# Patient Record
Sex: MALE | Race: WHITE | Employment: FULL TIME | URBAN - METROPOLITAN AREA
[De-identification: names, ages, dates, MRNs, and addresses within clinical notes are randomized per-mention and may not be internally consistent; named-entity substitution may affect disease eponyms.]

---

## 2022-01-01 ENCOUNTER — HOSPITAL ENCOUNTER (EMERGENCY)
Age: 42
Discharge: LEFT BEFORE DISCHARGE | End: 2022-01-01
Attending: EMERGENCY MEDICINE

## 2022-01-01 VITALS
WEIGHT: 180 LBS | OXYGEN SATURATION: 98 % | HEART RATE: 103 BPM | BODY MASS INDEX: 24.38 KG/M2 | SYSTOLIC BLOOD PRESSURE: 137 MMHG | HEIGHT: 72 IN | DIASTOLIC BLOOD PRESSURE: 91 MMHG | TEMPERATURE: 97.9 F | RESPIRATION RATE: 18 BRPM

## 2022-01-01 RX ORDER — DIAZEPAM 5 MG/1
5 TABLET ORAL ONCE
Status: DISCONTINUED | OUTPATIENT
Start: 2022-01-01 | End: 2022-01-01

## 2022-01-01 NOTE — ED TRIAGE NOTES
Patient to ER requesting alcohol detox. He is a daily drinker and drinks 1L of vodka a day. Last drink was 10am today. He denies previous seizures when detoxing. He does report he gets tremors within 12 hours of not drinking. Denies SI/HI.

## 2022-01-12 ENCOUNTER — HOSPITAL ENCOUNTER (INPATIENT)
Age: 42
LOS: 4 days | Discharge: HOME | DRG: 364 | End: 2022-01-17
Attending: STUDENT IN AN ORGANIZED HEALTH CARE EDUCATION/TRAINING PROGRAM | Admitting: INTERNAL MEDICINE
Payer: COMMERCIAL

## 2022-01-12 ENCOUNTER — APPOINTMENT (OUTPATIENT)
Dept: CT IMAGING | Age: 42
DRG: 364 | End: 2022-01-12
Payer: COMMERCIAL

## 2022-01-12 DIAGNOSIS — L02.11 NECK ABSCESS: ICD-10-CM

## 2022-01-12 DIAGNOSIS — F10.931 ACUTE HYPERACTIVE ALCOHOL WITHDRAWAL DELIRIUM (HCC): Primary | ICD-10-CM

## 2022-01-12 PROBLEM — F10.20 ETOH DEPENDENCE (HCC): Status: ACTIVE | Noted: 2022-01-12

## 2022-01-12 PROBLEM — L02.91 ABSCESS: Status: ACTIVE | Noted: 2022-01-12

## 2022-01-12 LAB
ALBUMIN SERPL-MCNC: 4.2 G/DL (ref 3.5–4.6)
ALP BLD-CCNC: 157 U/L (ref 35–104)
ALT SERPL-CCNC: 29 U/L (ref 0–41)
AMPHETAMINE SCREEN, URINE: ABNORMAL
ANION GAP SERPL CALCULATED.3IONS-SCNC: 19 MEQ/L (ref 9–15)
AST SERPL-CCNC: 122 U/L (ref 0–40)
BACTERIA: NEGATIVE /HPF
BARBITURATE SCREEN URINE: ABNORMAL
BASOPHILS ABSOLUTE: 0 K/UL (ref 0–0.2)
BASOPHILS RELATIVE PERCENT: 0.2 %
BENZODIAZEPINE SCREEN, URINE: ABNORMAL
BILIRUB SERPL-MCNC: 3.2 MG/DL (ref 0.2–0.7)
BILIRUBIN URINE: ABNORMAL
BLOOD, URINE: NEGATIVE
BUN BLDV-MCNC: 5 MG/DL (ref 6–20)
CALCIUM SERPL-MCNC: 8.5 MG/DL (ref 8.5–9.9)
CANNABINOID SCREEN URINE: POSITIVE
CHLORIDE BLD-SCNC: 86 MEQ/L (ref 95–107)
CLARITY: CLEAR
CO2: 26 MEQ/L (ref 20–31)
COCAINE METABOLITE SCREEN URINE: ABNORMAL
COLOR: ABNORMAL
CREAT SERPL-MCNC: 0.41 MG/DL (ref 0.7–1.2)
EOSINOPHILS ABSOLUTE: 0 K/UL (ref 0–0.7)
EOSINOPHILS RELATIVE PERCENT: 0 %
EPITHELIAL CELLS, UA: NORMAL /HPF (ref 0–5)
ETHANOL PERCENT: 0.12 G/DL
ETHANOL: 141 MG/DL (ref 0–0.08)
GFR AFRICAN AMERICAN: >60
GFR NON-AFRICAN AMERICAN: >60
GLOBULIN: 3.5 G/DL (ref 2.3–3.5)
GLUCOSE BLD-MCNC: 204 MG/DL (ref 70–99)
GLUCOSE URINE: 500 MG/DL
HCT VFR BLD CALC: 40.3 % (ref 42–52)
HEMOGLOBIN: 13.8 G/DL (ref 14–18)
HYALINE CASTS: NORMAL /HPF (ref 0–5)
KETONES, URINE: 15 MG/DL
LEUKOCYTE ESTERASE, URINE: ABNORMAL
LYMPHOCYTES ABSOLUTE: 0.2 K/UL (ref 1–4.8)
LYMPHOCYTES RELATIVE PERCENT: 4.4 %
Lab: ABNORMAL
MAGNESIUM: 1.6 MG/DL (ref 1.7–2.4)
MCH RBC QN AUTO: 34.1 PG (ref 27–31.3)
MCHC RBC AUTO-ENTMCNC: 34.2 % (ref 33–37)
MCV RBC AUTO: 99.5 FL (ref 80–100)
METHADONE SCREEN, URINE: ABNORMAL
MONOCYTES ABSOLUTE: 0.7 K/UL (ref 0.2–0.8)
MONOCYTES RELATIVE PERCENT: 12.7 %
NEUTROPHILS ABSOLUTE: 4.7 K/UL (ref 1.4–6.5)
NEUTROPHILS RELATIVE PERCENT: 82.7 %
NITRITE, URINE: POSITIVE
OPIATE SCREEN URINE: ABNORMAL
OXYCODONE URINE: ABNORMAL
PDW BLD-RTO: 12.9 % (ref 11.5–14.5)
PH UA: 5.5 (ref 5–9)
PHENCYCLIDINE SCREEN URINE: ABNORMAL
PLATELET # BLD: 64 K/UL (ref 130–400)
POTASSIUM SERPL-SCNC: 3.6 MEQ/L (ref 3.4–4.9)
PROCALCITONIN: 0.22 NG/ML (ref 0–0.15)
PROPOXYPHENE SCREEN: ABNORMAL
PROTEIN UA: 30 MG/DL
RBC # BLD: 4.05 M/UL (ref 4.7–6.1)
RBC UA: NORMAL /HPF (ref 0–5)
SARS-COV-2, NAAT: NOT DETECTED
SODIUM BLD-SCNC: 131 MEQ/L (ref 135–144)
SPECIFIC GRAVITY UA: 1.02 (ref 1–1.03)
TOTAL PROTEIN: 7.7 G/DL (ref 6.3–8)
URINE REFLEX TO CULTURE: ABNORMAL
UROBILINOGEN, URINE: 2 E.U./DL
WBC # BLD: 5.7 K/UL (ref 4.8–10.8)
WBC UA: NORMAL /HPF (ref 0–5)

## 2022-01-12 PROCEDURE — 87070 CULTURE OTHR SPECIMN AEROBIC: CPT

## 2022-01-12 PROCEDURE — 87635 SARS-COV-2 COVID-19 AMP PRB: CPT

## 2022-01-12 PROCEDURE — 84145 PROCALCITONIN (PCT): CPT

## 2022-01-12 PROCEDURE — 96365 THER/PROPH/DIAG IV INF INIT: CPT

## 2022-01-12 PROCEDURE — G0378 HOSPITAL OBSERVATION PER HR: HCPCS

## 2022-01-12 PROCEDURE — 70491 CT SOFT TISSUE NECK W/DYE: CPT

## 2022-01-12 PROCEDURE — 87185 SC STD ENZYME DETCJ PER NZM: CPT

## 2022-01-12 PROCEDURE — 96376 TX/PRO/DX INJ SAME DRUG ADON: CPT

## 2022-01-12 PROCEDURE — 96372 THER/PROPH/DIAG INJ SC/IM: CPT

## 2022-01-12 PROCEDURE — 85025 COMPLETE CBC W/AUTO DIFF WBC: CPT

## 2022-01-12 PROCEDURE — 99285 EMERGENCY DEPT VISIT HI MDM: CPT

## 2022-01-12 PROCEDURE — 6370000000 HC RX 637 (ALT 250 FOR IP): Performed by: STUDENT IN AN ORGANIZED HEALTH CARE EDUCATION/TRAINING PROGRAM

## 2022-01-12 PROCEDURE — 87040 BLOOD CULTURE FOR BACTERIA: CPT

## 2022-01-12 PROCEDURE — 36415 COLL VENOUS BLD VENIPUNCTURE: CPT

## 2022-01-12 PROCEDURE — 6360000002 HC RX W HCPCS: Performed by: STUDENT IN AN ORGANIZED HEALTH CARE EDUCATION/TRAINING PROGRAM

## 2022-01-12 PROCEDURE — 96366 THER/PROPH/DIAG IV INF ADDON: CPT

## 2022-01-12 PROCEDURE — 80053 COMPREHEN METABOLIC PANEL: CPT

## 2022-01-12 PROCEDURE — 81001 URINALYSIS AUTO W/SCOPE: CPT

## 2022-01-12 PROCEDURE — 6360000004 HC RX CONTRAST MEDICATION: Performed by: STUDENT IN AN ORGANIZED HEALTH CARE EDUCATION/TRAINING PROGRAM

## 2022-01-12 PROCEDURE — 87077 CULTURE AEROBIC IDENTIFY: CPT

## 2022-01-12 PROCEDURE — 6360000002 HC RX W HCPCS: Performed by: NURSE PRACTITIONER

## 2022-01-12 PROCEDURE — 96375 TX/PRO/DX INJ NEW DRUG ADDON: CPT

## 2022-01-12 PROCEDURE — 87186 SC STD MICRODIL/AGAR DIL: CPT

## 2022-01-12 PROCEDURE — 96367 TX/PROPH/DG ADDL SEQ IV INF: CPT

## 2022-01-12 PROCEDURE — 2580000003 HC RX 258: Performed by: STUDENT IN AN ORGANIZED HEALTH CARE EDUCATION/TRAINING PROGRAM

## 2022-01-12 PROCEDURE — 87147 CULTURE TYPE IMMUNOLOGIC: CPT

## 2022-01-12 PROCEDURE — 83735 ASSAY OF MAGNESIUM: CPT

## 2022-01-12 PROCEDURE — 82077 ASSAY SPEC XCP UR&BREATH IA: CPT

## 2022-01-12 PROCEDURE — 80307 DRUG TEST PRSMV CHEM ANLYZR: CPT

## 2022-01-12 PROCEDURE — 87205 SMEAR GRAM STAIN: CPT

## 2022-01-12 PROCEDURE — 87075 CULTR BACTERIA EXCEPT BLOOD: CPT

## 2022-01-12 PROCEDURE — 2500000003 HC RX 250 WO HCPCS: Performed by: STUDENT IN AN ORGANIZED HEALTH CARE EDUCATION/TRAINING PROGRAM

## 2022-01-12 RX ORDER — SODIUM CHLORIDE 0.9 % (FLUSH) 0.9 %
5-40 SYRINGE (ML) INJECTION PRN
Status: DISCONTINUED | OUTPATIENT
Start: 2022-01-12 | End: 2022-01-17 | Stop reason: HOSPADM

## 2022-01-12 RX ORDER — LORAZEPAM 2 MG/ML
2 INJECTION INTRAMUSCULAR ONCE
Status: COMPLETED | OUTPATIENT
Start: 2022-01-12 | End: 2022-01-12

## 2022-01-12 RX ORDER — 0.9 % SODIUM CHLORIDE 0.9 %
1000 INTRAVENOUS SOLUTION INTRAVENOUS ONCE
Status: COMPLETED | OUTPATIENT
Start: 2022-01-12 | End: 2022-01-13

## 2022-01-12 RX ORDER — MULTIVITAMIN WITH IRON
1 TABLET ORAL DAILY
Status: DISCONTINUED | OUTPATIENT
Start: 2022-01-13 | End: 2022-01-17 | Stop reason: HOSPADM

## 2022-01-12 RX ORDER — LORAZEPAM 1 MG/1
3 TABLET ORAL
Status: DISCONTINUED | OUTPATIENT
Start: 2022-01-12 | End: 2022-01-17 | Stop reason: HOSPADM

## 2022-01-12 RX ORDER — CLINDAMYCIN PHOSPHATE 900 MG/50ML
900 INJECTION INTRAVENOUS ONCE
Status: COMPLETED | OUTPATIENT
Start: 2022-01-12 | End: 2022-01-12

## 2022-01-12 RX ORDER — ACETAMINOPHEN 650 MG/1
650 SUPPOSITORY RECTAL EVERY 6 HOURS PRN
Status: DISCONTINUED | OUTPATIENT
Start: 2022-01-12 | End: 2022-01-17 | Stop reason: HOSPADM

## 2022-01-12 RX ORDER — LORAZEPAM 2 MG/ML
1 INJECTION INTRAMUSCULAR
Status: DISCONTINUED | OUTPATIENT
Start: 2022-01-12 | End: 2022-01-17 | Stop reason: HOSPADM

## 2022-01-12 RX ORDER — LORAZEPAM 1 MG/1
1 TABLET ORAL ONCE
Status: COMPLETED | OUTPATIENT
Start: 2022-01-12 | End: 2022-01-12

## 2022-01-12 RX ORDER — SODIUM CHLORIDE 9 MG/ML
25 INJECTION, SOLUTION INTRAVENOUS PRN
Status: DISCONTINUED | OUTPATIENT
Start: 2022-01-12 | End: 2022-01-17 | Stop reason: HOSPADM

## 2022-01-12 RX ORDER — LORAZEPAM 2 MG/ML
4 INJECTION INTRAMUSCULAR
Status: DISCONTINUED | OUTPATIENT
Start: 2022-01-12 | End: 2022-01-17 | Stop reason: HOSPADM

## 2022-01-12 RX ORDER — ONDANSETRON 4 MG/1
4 TABLET, ORALLY DISINTEGRATING ORAL EVERY 8 HOURS PRN
Status: DISCONTINUED | OUTPATIENT
Start: 2022-01-12 | End: 2022-01-17 | Stop reason: HOSPADM

## 2022-01-12 RX ORDER — SODIUM CHLORIDE 0.9 % (FLUSH) 0.9 %
5-40 SYRINGE (ML) INJECTION EVERY 12 HOURS SCHEDULED
Status: DISCONTINUED | OUTPATIENT
Start: 2022-01-12 | End: 2022-01-17 | Stop reason: HOSPADM

## 2022-01-12 RX ORDER — 0.9 % SODIUM CHLORIDE 0.9 %
1000 INTRAVENOUS SOLUTION INTRAVENOUS ONCE
Status: COMPLETED | OUTPATIENT
Start: 2022-01-12 | End: 2022-01-12

## 2022-01-12 RX ORDER — LORAZEPAM 1 MG/1
2 TABLET ORAL
Status: DISCONTINUED | OUTPATIENT
Start: 2022-01-12 | End: 2022-01-17 | Stop reason: HOSPADM

## 2022-01-12 RX ORDER — LORAZEPAM 1 MG/1
4 TABLET ORAL
Status: DISCONTINUED | OUTPATIENT
Start: 2022-01-12 | End: 2022-01-17 | Stop reason: HOSPADM

## 2022-01-12 RX ORDER — ONDANSETRON 2 MG/ML
4 INJECTION INTRAMUSCULAR; INTRAVENOUS EVERY 6 HOURS PRN
Status: DISCONTINUED | OUTPATIENT
Start: 2022-01-12 | End: 2022-01-17 | Stop reason: HOSPADM

## 2022-01-12 RX ORDER — POLYETHYLENE GLYCOL 3350 17 G/17G
17 POWDER, FOR SOLUTION ORAL DAILY PRN
Status: DISCONTINUED | OUTPATIENT
Start: 2022-01-12 | End: 2022-01-17 | Stop reason: HOSPADM

## 2022-01-12 RX ORDER — LORAZEPAM 1 MG/1
1 TABLET ORAL
Status: DISCONTINUED | OUTPATIENT
Start: 2022-01-12 | End: 2022-01-17 | Stop reason: HOSPADM

## 2022-01-12 RX ORDER — LANOLIN ALCOHOL/MO/W.PET/CERES
100 CREAM (GRAM) TOPICAL DAILY
Status: DISCONTINUED | OUTPATIENT
Start: 2022-01-13 | End: 2022-01-17 | Stop reason: HOSPADM

## 2022-01-12 RX ORDER — ACETAMINOPHEN 325 MG/1
650 TABLET ORAL EVERY 6 HOURS PRN
Status: DISCONTINUED | OUTPATIENT
Start: 2022-01-12 | End: 2022-01-17 | Stop reason: HOSPADM

## 2022-01-12 RX ORDER — LORAZEPAM 2 MG/ML
2 INJECTION INTRAMUSCULAR
Status: DISCONTINUED | OUTPATIENT
Start: 2022-01-12 | End: 2022-01-17 | Stop reason: HOSPADM

## 2022-01-12 RX ORDER — PROMETHAZINE HYDROCHLORIDE 25 MG/ML
25 INJECTION, SOLUTION INTRAMUSCULAR; INTRAVENOUS ONCE
Status: COMPLETED | OUTPATIENT
Start: 2022-01-12 | End: 2022-01-12

## 2022-01-12 RX ORDER — LORAZEPAM 2 MG/ML
3 INJECTION INTRAMUSCULAR
Status: DISCONTINUED | OUTPATIENT
Start: 2022-01-12 | End: 2022-01-17 | Stop reason: HOSPADM

## 2022-01-12 RX ADMIN — SODIUM CHLORIDE 1000 ML: 9 INJECTION, SOLUTION INTRAVENOUS at 17:36

## 2022-01-12 RX ADMIN — LORAZEPAM 2 MG: 2 INJECTION INTRAMUSCULAR; INTRAVENOUS at 15:29

## 2022-01-12 RX ADMIN — PROMETHAZINE HYDROCHLORIDE 25 MG: 25 INJECTION, SOLUTION INTRAMUSCULAR; INTRAVENOUS at 15:28

## 2022-01-12 RX ADMIN — CLINDAMYCIN PHOSPHATE 900 MG: 900 INJECTION, SOLUTION INTRAVENOUS at 22:13

## 2022-01-12 RX ADMIN — IOPAMIDOL 100 ML: 612 INJECTION, SOLUTION INTRAVENOUS at 20:36

## 2022-01-12 RX ADMIN — LORAZEPAM 2 MG: 2 INJECTION INTRAMUSCULAR; INTRAVENOUS at 21:39

## 2022-01-12 RX ADMIN — LORAZEPAM 3 MG: 2 INJECTION, SOLUTION INTRAMUSCULAR; INTRAVENOUS at 23:41

## 2022-01-12 RX ADMIN — SODIUM CHLORIDE 1000 ML: 9 INJECTION, SOLUTION INTRAVENOUS at 21:38

## 2022-01-12 RX ADMIN — LORAZEPAM 1 MG: 1 TABLET ORAL at 17:36

## 2022-01-12 RX ADMIN — THIAMINE HYDROCHLORIDE: 100 INJECTION, SOLUTION INTRAMUSCULAR; INTRAVENOUS at 15:37

## 2022-01-12 ASSESSMENT — ENCOUNTER SYMPTOMS
COUGH: 0
PHOTOPHOBIA: 0
BACK PAIN: 0
DIARRHEA: 0
CHEST TIGHTNESS: 0
ABDOMINAL PAIN: 0
NAUSEA: 1
SHORTNESS OF BREATH: 0
WHEEZING: 0
SINUS PRESSURE: 0
VOMITING: 1
TROUBLE SWALLOWING: 0
RHINORRHEA: 0
DIARRHEA: 1

## 2022-01-12 ASSESSMENT — PAIN DESCRIPTION - PAIN TYPE: TYPE: ACUTE PAIN

## 2022-01-12 ASSESSMENT — PAIN SCALES - GENERAL
PAINLEVEL_OUTOF10: 5
PAINLEVEL_OUTOF10: 0

## 2022-01-12 ASSESSMENT — PAIN DESCRIPTION - FREQUENCY: FREQUENCY: CONTINUOUS

## 2022-01-12 ASSESSMENT — PAIN DESCRIPTION - DESCRIPTORS: DESCRIPTORS: ACHING

## 2022-01-12 ASSESSMENT — PAIN DESCRIPTION - LOCATION: LOCATION: NECK

## 2022-01-12 NOTE — ED NOTES
Pt resting in bed, IV fluids infusing, reminded on need for urine specimen. Provided water per request by Claire Elmore.      Claire Jacobsen RN  01/12/22 4387

## 2022-01-12 NOTE — ED NOTES
Pt states he feels very dizzy (assisted pt to the bathroom). Pt was very unsteady on his feet.       Mayra Mary RN  01/12/22 8334

## 2022-01-13 PROBLEM — L02.11 NECK ABSCESS: Status: ACTIVE | Noted: 2022-01-12

## 2022-01-13 LAB
INR BLD: 1.2
PROTHROMBIN TIME: 14.8 SEC (ref 12.3–14.9)

## 2022-01-13 PROCEDURE — 6360000002 HC RX W HCPCS: Performed by: INTERNAL MEDICINE

## 2022-01-13 PROCEDURE — 85610 PROTHROMBIN TIME: CPT

## 2022-01-13 PROCEDURE — 99222 1ST HOSP IP/OBS MODERATE 55: CPT | Performed by: INTERNAL MEDICINE

## 2022-01-13 PROCEDURE — 6370000000 HC RX 637 (ALT 250 FOR IP): Performed by: INTERNAL MEDICINE

## 2022-01-13 PROCEDURE — 2580000003 HC RX 258: Performed by: INTERNAL MEDICINE

## 2022-01-13 PROCEDURE — 6370000000 HC RX 637 (ALT 250 FOR IP): Performed by: NURSE PRACTITIONER

## 2022-01-13 PROCEDURE — 2580000003 HC RX 258: Performed by: NURSE PRACTITIONER

## 2022-01-13 PROCEDURE — 96366 THER/PROPH/DIAG IV INF ADDON: CPT

## 2022-01-13 PROCEDURE — 36415 COLL VENOUS BLD VENIPUNCTURE: CPT

## 2022-01-13 PROCEDURE — 6360000002 HC RX W HCPCS: Performed by: NURSE PRACTITIONER

## 2022-01-13 PROCEDURE — 96376 TX/PRO/DX INJ SAME DRUG ADON: CPT

## 2022-01-13 PROCEDURE — 2060000000 HC ICU INTERMEDIATE R&B

## 2022-01-13 PROCEDURE — 2500000003 HC RX 250 WO HCPCS: Performed by: NURSE PRACTITIONER

## 2022-01-13 PROCEDURE — 82607 VITAMIN B-12: CPT

## 2022-01-13 PROCEDURE — 82746 ASSAY OF FOLIC ACID SERUM: CPT

## 2022-01-13 RX ORDER — L. ACIDOPHILUS/L.BULGARICUS 1MM CELL
1 TABLET ORAL 2 TIMES DAILY
Status: DISCONTINUED | OUTPATIENT
Start: 2022-01-13 | End: 2022-01-17 | Stop reason: HOSPADM

## 2022-01-13 RX ORDER — CLINDAMYCIN PHOSPHATE 900 MG/50ML
900 INJECTION INTRAVENOUS EVERY 8 HOURS
Status: DISCONTINUED | OUTPATIENT
Start: 2022-01-13 | End: 2022-01-13

## 2022-01-13 RX ORDER — MAGNESIUM SULFATE IN WATER 40 MG/ML
2000 INJECTION, SOLUTION INTRAVENOUS ONCE
Status: COMPLETED | OUTPATIENT
Start: 2022-01-13 | End: 2022-01-13

## 2022-01-13 RX ORDER — CHLORDIAZEPOXIDE HYDROCHLORIDE 25 MG/1
25 CAPSULE, GELATIN COATED ORAL 3 TIMES DAILY
Status: DISCONTINUED | OUTPATIENT
Start: 2022-01-13 | End: 2022-01-14

## 2022-01-13 RX ADMIN — Medication 10 ML: at 21:59

## 2022-01-13 RX ADMIN — MAGNESIUM SULFATE HEPTAHYDRATE 2000 MG: 2 INJECTION, SOLUTION INTRAVENOUS at 01:12

## 2022-01-13 RX ADMIN — LACTOBACILLUS TAB 1 TABLET: TAB at 21:59

## 2022-01-13 RX ADMIN — Medication 5 ML: at 09:12

## 2022-01-13 RX ADMIN — PIPERACILLIN SODIUM AND TAZOBACTAM SODIUM 3375 MG: 3; .375 INJECTION, POWDER, LYOPHILIZED, FOR SOLUTION INTRAVENOUS at 21:59

## 2022-01-13 RX ADMIN — THERA TABS 1 TABLET: TAB at 09:07

## 2022-01-13 RX ADMIN — LORAZEPAM 1 MG: 2 INJECTION INTRAMUSCULAR; INTRAVENOUS at 05:52

## 2022-01-13 RX ADMIN — LORAZEPAM 1 MG: 1 TABLET ORAL at 09:08

## 2022-01-13 RX ADMIN — LORAZEPAM 1 MG: 1 TABLET ORAL at 12:45

## 2022-01-13 RX ADMIN — CHLORDIAZEPOXIDE HYDROCHLORIDE 25 MG: 25 CAPSULE ORAL at 21:59

## 2022-01-13 RX ADMIN — Medication 100 MG: at 09:08

## 2022-01-13 RX ADMIN — CLINDAMYCIN PHOSPHATE 900 MG: 900 INJECTION, SOLUTION INTRAVENOUS at 11:03

## 2022-01-13 RX ADMIN — CHLORDIAZEPOXIDE HYDROCHLORIDE 25 MG: 25 CAPSULE ORAL at 11:02

## 2022-01-13 RX ADMIN — CHLORDIAZEPOXIDE HYDROCHLORIDE 25 MG: 25 CAPSULE ORAL at 14:34

## 2022-01-13 RX ADMIN — LORAZEPAM 1 MG: 1 TABLET ORAL at 18:20

## 2022-01-13 RX ADMIN — LACTOBACILLUS TAB 1 TABLET: TAB at 14:34

## 2022-01-13 RX ADMIN — PIPERACILLIN SODIUM AND TAZOBACTAM SODIUM 3375 MG: 3; .375 INJECTION, POWDER, LYOPHILIZED, FOR SOLUTION INTRAVENOUS at 14:45

## 2022-01-13 ASSESSMENT — PAIN SCALES - GENERAL: PAINLEVEL_OUTOF10: 0

## 2022-01-13 ASSESSMENT — ENCOUNTER SYMPTOMS
COUGH: 0
NAUSEA: 0
DIARRHEA: 0
VOMITING: 0
SHORTNESS OF BREATH: 0

## 2022-01-13 NOTE — CONSULTS
Department of Otolaryngology  Attending Consult Note      Reason for Consult: Evaluation and management for neck abscess  Requesting Physician: Dr. Heather Scanlon: Neck swelling of 3 to 4 days duration    History Obtained From:  patient    HISTORY OF PRESENT ILLNESS:                The patient is a 39 y.o. male with significant past medical history of alcohol withdrawal who presents with neck swelling of 3 to 4 days duration. Past Medical History:    No past medical history on file. Past Surgical History:    No past surgical history on file.   Current Medications:   Current Facility-Administered Medications: clindamycin (CLEOCIN) 900 mg in dextrose 5 % 50 mL IVPB, 900 mg, IntraVENous, Q8H  chlordiazePOXIDE (LIBRIUM) capsule 25 mg, 25 mg, Oral, TID  sodium chloride flush 0.9 % injection 5-40 mL, 5-40 mL, IntraVENous, 2 times per day  sodium chloride flush 0.9 % injection 5-40 mL, 5-40 mL, IntraVENous, PRN  0.9 % sodium chloride infusion, 25 mL, IntraVENous, PRN  ondansetron (ZOFRAN-ODT) disintegrating tablet 4 mg, 4 mg, Oral, Q8H PRN **OR** ondansetron (ZOFRAN) injection 4 mg, 4 mg, IntraVENous, Q6H PRN  polyethylene glycol (GLYCOLAX) packet 17 g, 17 g, Oral, Daily PRN  acetaminophen (TYLENOL) tablet 650 mg, 650 mg, Oral, Q6H PRN **OR** acetaminophen (TYLENOL) suppository 650 mg, 650 mg, Rectal, Q6H PRN  thiamine tablet 100 mg, 100 mg, Oral, Daily  multivitamin 1 tablet, 1 tablet, Oral, Daily  LORazepam (ATIVAN) tablet 1 mg, 1 mg, Oral, Q1H PRN **OR** LORazepam (ATIVAN) injection 1 mg, 1 mg, IntraVENous, Q1H PRN **OR** LORazepam (ATIVAN) tablet 2 mg, 2 mg, Oral, Q1H PRN **OR** LORazepam (ATIVAN) injection 2 mg, 2 mg, IntraVENous, Q1H PRN **OR** LORazepam (ATIVAN) tablet 3 mg, 3 mg, Oral, Q1H PRN **OR** LORazepam (ATIVAN) injection 3 mg, 3 mg, IntraVENous, Q1H PRN **OR** LORazepam (ATIVAN) tablet 4 mg, 4 mg, Oral, Q1H PRN **OR** LORazepam (ATIVAN) injection 4 mg, 4 mg, IntraVENous, Q1H PRN  Allergies:  Patient has no known allergies. Social History:    TOBACCO:   reports that he has been smoking cigarettes. He has been smoking about 0.25 packs per day. He has never used smokeless tobacco.  ETOH:   reports current alcohol use. DRUGS:   reports previous drug use. SEXUAL HISTORY:  @[facility]@  ACTIVITIES OF DAILY LIVING:  @[facility]@  MARITAL STATUS:  @[facility]@  OCCUPATION:  @[facility]@  LEVEL OF EDUCATION:  @[facility]@  Family History:   No family history on file. REVIEW OF SYSTEMS:    HEENT:  negative except for  hearing loss  RESPIRATORY:  negative except for    GASTROINTESTINAL:  negative except for     PHYSICAL EXAM:    VITALS:  /72   Pulse 98   Temp 97.9 °F (36.6 °C) (Oral)   Resp 18   Ht 6' (1.829 m)   Wt 185 lb (83.9 kg)   SpO2 97%   BMI 25.09 kg/m²   24HR INTAKE/OUTPUT:    Intake/Output Summary (Last 24 hours) at 1/13/2022 1045  Last data filed at 1/13/2022 0545  Gross per 24 hour   Intake 60 ml   Output --   Net 60 ml     URINARY CATHETER OUTPUT ([place]):     DRAIN/TUBE OUTPUT:     VENT SETTINGS:  Vent Information  SpO2: 97 %  Additional Respiratory  Assessments  Pulse: 98  Resp: 18  SpO2: 97 %  CONSTITUTIONAL:  awake, alert, cooperative, no apparent distress, and appears stated age  ENT:  Normocephalic, without obvious abnormality, atraumatic, sinuses nontender on palpation, external ears without lesions, oral pharynx with moist mucus membranes, tonsils without erythema or exudates, gums normal and good dentition.   NECK:  Supple, diffuse enlargement, symmetrical, trachea midline, no adenopathy, thyroid symmetric, not enlarged and no tenderness, skin normal    DATA:    Recent Labs     01/12/22  1430   WBC 5.7   HGB 13.8*   HCT 40.3*   PLT 64*     Recent Labs     01/12/22  1430   *   K 3.6   CL 86*   CO2 26   BUN 5*   CREATININE 0.41*   CALCIUM 8.5     Recent Labs     01/12/22  1430   *   ALT 29   BILITOT 3.2*   ALKPHOS 157*     No results for input(s): INR in the last 72 hours. No results for input(s): Xenia Cast in the last 72 hours. CT SOFT TISSUE NECK W CONTRAST    Result Date: 1/13/2022  EXAMINATION: CT SOFT TISSUE NECK W CONTRAST DATE AND TIME:1/12/2022 8:32 PM CLINICAL HISTORY: ACUTE NECK PAIN. SWELLING, DRAINAGE, PAIN COMPARISON: NONE TECHNIQUE: Sequential axial CT images were obtained from the skull base to the thoracic inlet. Following the administration of 100 cc of nonionic IV contrast.  All CT scans at this facility use dose modulation, iterative reconstruction, and/or weight based dosing when appropriate to reduce radiation dose to as low as reasonably achievable. FINDINGS: 4 x 3.8 x 1.5 cm enhancing fluid collection in the right submandibular space with adjacent thickening and enhancement of the right platysma muscle and adjacent to 3 cm area of edema/fluid in the overlying right subcutaneous tissues. Findings consistent with right submandibular space abscess. Diffuse subcutaneous edema throughout the neck area compatible with cellulitis. Dental caries in the area of the right lower second molar. Otherwise the soft tissues of the nasopharynx and oropharynx are within normal limits. Remaining deep spaces included the parotid are unremarkable. No salivary gland stone. No cervical adenopathy. Vascular structures are opacified and appear normal.. No airway compromise. Visualized sinuses are clear. RIGHT SUBMANDIBULAR ABSCESS WITH ASSOCIATED CELLULITIS OF THE NECK. IMPRESSION/RECOMMENDATIONS:      Neck abscess    Alcohol withdrawal    Mild trismus    Plan    I will review the x-ray with the radiologist it looks as though he would need incision and drainage of neck abscess and this can be done tomorrow and under general anesthesia.

## 2022-01-13 NOTE — CARE COORDINATION
[facility] Case Management Initial Discharge Assessment    Met with Patient to discuss discharge plan. PCP: No primary care provider on file. Date of Last Visit: none    If no PCP, list provided? Declined    Discharge Planning    Living Arrangements: independently at home    Who do you live with? Leatha Coombs   Who helps you with your care:  self    If lives at home:     Do you have any barriers navigating in your home? no    Patient can perform ADL? Yes    Current Services (outpatient and in home) :  None    Dialysis: No    Is transportation available to get to your appointments? Yes    DME Equipment:  NONE   Respiratory equipment: None    Respiratory provider:  no     Pharmacy:  yes - [facility] / [address] with Medication Assistance Program?  No      Patient agreeable to [facility]? No    Patient agreeable to SNF/Rehab? No    Other discharge needs identified? N/A    Does Patient Have a High-Risk for Readmission Diagnosis (CHF, PN, MI, COPD)? No         Initial Discharge Plan? (Note: please see concurrent daily documentation for any updates after initial note). Patient is alert, oriented pleasant. Patient plans to return home with juhi Zhang upon discharge. Patient has been through alcohol rehab in the past and states that he is determined to stop drinking. He is supported well by friends, family, fanny. Patient denies needs.      Readmission Risk              Risk of Unplanned Readmission:  0        DCCOP COMPLETED   Electronically signed by MAGALYS Sebastian, LSW on 1/13/2022 at 12:04 PM

## 2022-01-13 NOTE — ED PROVIDER NOTES
[facility] ED  eMERGENCYdEPARTMENT eNCOUnter      Pt Name: Itzel Johnston  MRN: [id number]  Abrahamgfyesi 1980  Date of evaluation: 1/12/2022  Provider:Jad Price DO    CHIEF COMPLAINT       Chief Complaint   Patient presents with    Alcohol Problem     pt c/o N/V/D, tremors, and chills from alcohol withdrawl, last drink approx 0600 today    Abscess     under chin on the right side (had 1.5 weeks or so)          HISTORY OF PRESENT ILLNESS  (Location/Symptom, Timing/Onset, Context/Setting, Quality, Duration, Modifying Factors, Severity.)   Itzel Johsnton is a 39 y.o. male who presents to the emergency department complaint of alcohol withdrawal.  Patient had \"the shakes\" some nausea and vomiting and chills. During the patient's work-up however he pointed to an area under his beard where there is a draining abscess. He states that he forgot to tell the ER physician about this. Immediately after reporting this to ED attending ordered a CAT scan of the neck. Patient is tachycardic. He is tremulous. He has dry heaves. Patient's last drink of alcohol was at 6 AM today. He usually drinks a bottle of vodka every single day. Patient states that 1 point he did go to rehab through a [facility] house and he was basically on lockdown for 3 whole months where he was sober. Patient states that he has been drinking alcohol for at least 20 years or more. Patient states he decided he wanted to quit today. CIWA score was 12 at time of the ED attending examined the patient. The history is provided by the patient. Nursing Notes were reviewed and I agree. REVIEW OF SYSTEMS    (2-9 systems for level 4, 10 or more for level 5)     Review of Systems   Constitutional: Negative for activity change, appetite change, chills, fever and unexpected weight change. HENT: Negative for drooling, ear pain, nosebleeds, sinus pressure and trouble swallowing.     Respiratory: Negative for cough, chest tightness and shortness of breath. Cardiovascular: Negative for chest pain and leg swelling. Gastrointestinal: Positive for diarrhea, nausea and vomiting. Negative for abdominal pain. Endocrine: Negative for polydipsia and polyphagia. Genitourinary: Negative for dysuria, flank pain and frequency. Musculoskeletal: Negative for back pain and myalgias. Skin: Positive for wound. Negative for pallor and rash. Neurological: Positive for tremors. Negative for syncope, weakness and headaches. Hematological: Does not bruise/bleed easily. All other systems reviewed and are negative. Except as noted above the remainder of the review of systems was reviewed and negative. PAST MEDICAL HISTORY   No past medical history on file. SURGICAL HISTORY     No past surgical history on file. CURRENT MEDICATIONS       Previous Medications    No medications on file       ALLERGIES     Patient has no known allergies. FAMILY HISTORY     No family history on file.        SOCIAL HISTORY       Social History     Socioeconomic History    Marital status:      Spouse name: Not on file    Number of children: Not on file    Years of education: Not on file    Highest education level: Not on file   Occupational History    Not on file   Tobacco Use    Smoking status: Current Every Day Smoker     Packs/day: 0.25     Types: Cigarettes    Smokeless tobacco: Never Used   Substance and Sexual Activity    Alcohol use: Yes     Comment: daily/ 1L vodka    Drug use: Not Currently    Sexual activity: Yes     Partners: Female   Other Topics Concern    Not on file   Social History Narrative    Not on file     Social Determinants of Health     Financial Resource Strain:     Difficulty of Paying Living Expenses: Not on file   Food Insecurity:     Worried About Running Out of Food in the Last Year: Not on file    [place] of Food in the Last Year: Not on file   Transportation Needs:     Lack of Transportation (Medical): Not on file    Lack of Transportation (Non-Medical): Not on file   Physical Activity:     Days of Exercise per Week: Not on file    Minutes of Exercise per Session: Not on file   Stress:     Feeling of Stress : Not on file   Social Connections:     Frequency of Communication with Friends and Family: Not on file    Frequency of Social Gatherings with Friends and Family: Not on file    Attends Confucianism Services: Not on file    Active Member of [address] [place] [address] or Organizations: Not on file    Attends Club or Organization Meetings: Not on file    Marital Status: Not on file   Intimate Partner Violence:     Fear of Current or Ex-Partner: Not on file    Emotionally Abused: Not on file    Physically Abused: Not on file    Sexually Abused: Not on file   Housing Stability:     Unable to Pay for Housing in the Last Year: Not on file    Number of [place] in the Last Year: Not on file    Unstable Housing in the Last Year: Not on file       SCREENINGS    Barbara Coma Scale  Eye Opening: Spontaneous  Best Verbal Response: Oriented  Best Motor Response: Obeys commands  [place] Coma Scale Score: 15      PHYSICAL EXAM    (up to 7 forlevel 4, 8 or more for level 5)     ED Triage Vitals [01/12/22 1407]   BP Temp Temp Source Pulse Resp SpO2 Height Weight   128/85 98.4 °F (36.9 °C) Oral 133 16 94 % 6' (1.829 m) 185 lb (83.9 kg)       Physical Exam  Vitals and nursing note reviewed. Constitutional:       General: He is in acute distress. Appearance: He is well-developed. He is not diaphoretic. HENT:      Head: Normocephalic and atraumatic. No Mcconnell's sign. Right Ear: External ear normal.      Left Ear: External ear normal.      Nose: Nose normal.      Mouth/Throat:      Mouth: Mucous membranes are moist.      Pharynx: No oropharyngeal exudate. Eyes:      General: No scleral icterus. Right eye: No foreign body.       Conjunctiva/sclera: Conjunctivae normal.      Left eye: No exudate. Pupils: Pupils are equal, round, and reactive to light. Neck:      Vascular: No JVD. Trachea: Trachea and phonation normal. No tracheal tenderness or tracheal deviation. Cardiovascular:      Rate and Rhythm: Regular rhythm. Tachycardia present. Pulses: Normal pulses. Heart sounds: Normal heart sounds. Heart sounds not distant. No murmur heard. No friction rub. No gallop. Pulmonary:      Effort: Pulmonary effort is normal. No respiratory distress. Breath sounds: Normal breath sounds. No stridor. No wheezing. Abdominal:      General: Bowel sounds are normal. There is no distension. Palpations: Abdomen is soft. Tenderness: There is no abdominal tenderness. There is no guarding or rebound. Musculoskeletal:         General: No tenderness. Normal range of motion. Cervical back: Normal range of motion and neck supple. No rigidity. No pain with movement. Lymphadenopathy:      Head:      Right side of head: No submental adenopathy. Left side of head: No submental adenopathy. Cervical: Cervical adenopathy present. Right cervical: Superficial cervical adenopathy present. Skin:     General: Skin is warm and dry. Capillary Refill: Capillary refill takes less than 2 seconds. Findings: No erythema or rash. Neurological:      General: No focal deficit present. Mental Status: He is alert and oriented to person, place, and time. Mental status is at baseline. GCS: GCS eye subscore is 4. GCS verbal subscore is 5. GCS motor subscore is 6. Cranial Nerves: No cranial nerve deficit. Motor: Tremor present. Coordination: Coordination normal.      Deep Tendon Reflexes: Reflexes are normal and symmetric. Psychiatric:         Behavior: Behavior normal.         Thought Content:  Thought content normal.         Judgment: Judgment normal.           DIAGNOSTIC RESULTS     RADIOLOGY:   Non-plain film images such as CT, Ultrasound and MRI are read by the radiologist. [facility] radiographic images are visualized and preliminarilyinterpreted by Abena Arellano DO with the below findings:    CT of the neck with IV contrast (stat rad reading of soft tissue neck and colon (peripheral enhancing fluid collection right platysma muscle measuring 4 x 3.8 x 1.5 cm compatible with abscess. Moderate superficial fluid and adjacent subcu fat measuring 2.7 cm. Moderate cellulitis right neck. Moderate motion artifact. No airway compromise.     Interpretation per the Radiologist below, if available at the time of this note:    CT SOFT TISSUE NECK W CONTRAST    (Results Pending)       LABS:  Labs Reviewed   COMPREHENSIVE METABOLIC PANEL - Abnormal; Notable for the following components:       Result Value    Sodium 131 (*)     Chloride 86 (*)     Anion Gap 19 (*)     Glucose 204 (*)     BUN 5 (*)     CREATININE 0.41 (*)     Total Bilirubin 3.2 (*)     Alkaline Phosphatase 157 (*)      (*)     All other components within normal limits   CBC WITH AUTO DIFFERENTIAL - Abnormal; Notable for the following components:    RBC 4.05 (*)     Hemoglobin 13.8 (*)     Hematocrit 40.3 (*)     MCH 34.1 (*)     Platelets 64 (*)     Lymphocytes Absolute 0.2 (*)     All other components within normal limits   URINE RT REFLEX TO CULTURE - Abnormal; Notable for the following components:    Color, UA ORANGE (*)     Glucose, Ur 500 (*)     Bilirubin Urine MODERATE (*)     Ketones, Urine 15 (*)     Protein, UA 30 (*)     Urobilinogen, Urine 2.0 (*)     Nitrite, Urine POSITIVE (*)     Leukocyte Esterase, Urine TRACE (*)     All other components within normal limits   URINE DRUG SCREEN - Abnormal; Notable for the following components:    Cannabinoid Scrn, Ur POSITIVE (*)     All other components within normal limits   PROCALCITONIN - Abnormal; Notable for the following components:    Procalcitonin 0.22 (*)     All other components within normal limits   MAGNESIUM - Abnormal; Notable for the following components:    Magnesium 1.6 (*)     All other components within normal limits   COVID-19, RAPID   CULTURE, BLOOD 2   CULTURE, BLOOD 1   CULTURE, ANAEROBIC AND AEROBIC   ETHANOL   MICROSCOPIC URINALYSIS   MAGNESIUM       All other labs were within normal range or not returnedas of this dictation. EMERGENCYDEPARTMENT COURSE and DIFFERENTIAL DIAGNOSIS/MDM:   Vitals:    Vitals:    01/12/22 1931 01/12/22 2116 01/12/22 2125 01/12/22 2219   BP: (!) 151/98 (!) 147/90 (!) 179/102 (!) 138/92   Pulse: 115 119 142 132   Resp: 18 18     Temp:       TempSrc:       SpO2: 99% 99%     Weight:       Height:               MDM  Patient is acute alcohol intoxication. He received IV banana bag. Doses of IV Ativan. IM Phenergan. Patient's heart rate had come down to 115 when reevaluated abscess to the patient's neck that is hidden within his beard. ER physician stable palpate the areas got some induration as well as easily expressible with copious amounts of purulent material.    ED attending asked the patient why he did not tell him about this. The patient states \"I forgot. \"    At this point a CAT scan of the neck was then ordered. Shows an abscess. Nurse was able to get a wound culture and copious amounts of purulent material easily came out. Patient started on IV clindamycin. Patient to be admitted for alcohol intoxication, neck cellulitis and abscess. Was discussed with the hospitalist and Dr. Edna Barry accepted the patient. PROCEDURES:    Procedures      FINAL IMPRESSION      1. Acute hyperactive alcohol withdrawal delirium ([facility] [address]    2. Neck abscess          DISPOSITION/PLAN   DISPOSITION Admitted 01/12/2022 10:06:06 PM      PATIENT REFERRED TO:  No follow-up provider specified.     DISCHARGE MEDICATIONS:  New Prescriptions    No medications on file       (Please note thatportions of this note were completed with a voice recognition program.  Efforts were made to edit the dictations but occasionally words are mis-transcribed.)    Jignesh Price,       [address] [facility],   01/12/22 3898

## 2022-01-13 NOTE — PROGRESS NOTES
Progress Note  Date:2022       [facility]  Patient Sawyer Lamar     Date of Birth:[birth year]     Age:41 y.o. Subjective    Subjective:  Symptoms:  He reports malaise and weakness. No shortness of breath, cough, chest pain, headache, chest pressure, anorexia, diarrhea or anxiety. Diet:  No nausea or vomiting. Review of Systems   Respiratory: Negative for cough and shortness of breath. Cardiovascular: Negative for chest pain. Gastrointestinal: Negative for anorexia, diarrhea, nausea and vomiting. Neurological: Positive for weakness. Objective         Vitals Last 24 Hours:  TEMPERATURE:  Temp  Av.2 °F (36.8 °C)  Min: 97.9 °F (36.6 °C)  Max: 98.4 °F (36.9 °C)  RESPIRATIONS RANGE: Resp  Av.2  Min: 16  Max: 18  PULSE OXIMETRY RANGE: SpO2  Av.4 %  Min: 94 %  Max: 99 %  PULSE RANGE: Pulse  Av.8  Min: 96  Max: 142  BLOOD PRESSURE RANGE: Systolic (40SOI), QE , Min:109 , OQB:711   ; Diastolic (26KTW), SY, Min:72, Max:102    I/O (24Hr): Intake/Output Summary (Last 24 hours) at 2022 1152  Last data filed at 2022 0545  Gross per 24 hour   Intake 60 ml   Output --   Net 60 ml     Objective:  General Appearance:  Comfortable, well-appearing and in no acute distress. Vital signs: (most recent): Blood pressure 109/72, pulse 98, temperature 97.9 °F (36.6 °C), temperature source Oral, resp. rate 18, height 6' (1.829 m), weight 185 lb (83.9 kg), SpO2 97 %. Lungs:  Normal effort and normal respiratory rate. Heart: Normal rate. S1 normal and S2 normal.    Abdomen: Abdomen is soft. Bowel sounds are normal.     Pulses: Distal pulses are intact. Neurological: Patient is alert and oriented to person, place and time. Pupils:  Pupils are equal, round, and reactive to light. Skin:  Warm and dry.       Labs/Imaging/Diagnostics    Labs:  CBC:  Recent Labs     22  1430   WBC 5.7   RBC 4.05*   HGB 13.8*   HCT 40.3*   MCV 99.5   RDW 12.9 PLT 64*     CHEMISTRIES:  Recent Labs     01/12/22  1430 01/12/22 1951   *  --    K 3.6  --    CL 86*  --    CO2 26  --    BUN 5*  --    CREATININE 0.41*  --    GLUCOSE 204*  --    MG  --  1.6*     PT/INR:No results for input(s): PROTIME, INR in the last 72 hours. APTT:No results for input(s): APTT in the last 72 hours. LIVER PROFILE:  Recent Labs     01/12/22  1430   *   ALT 29   BILITOT 3.2*   ALKPHOS 157*       Imaging Last 24 Hours:  CT SOFT TISSUE NECK W CONTRAST    Result Date: 1/13/2022  EXAMINATION: CT SOFT TISSUE NECK W CONTRAST DATE AND TIME:1/12/2022 8:32 PM CLINICAL HISTORY: ACUTE NECK PAIN. SWELLING, DRAINAGE, PAIN COMPARISON: NONE TECHNIQUE: Sequential axial CT images were obtained from the skull base to the thoracic inlet. Following the administration of 100 cc of nonionic IV contrast.  All CT scans at this facility use dose modulation, iterative reconstruction, and/or weight based dosing when appropriate to reduce radiation dose to as low as reasonably achievable. FINDINGS: 4 x 3.8 x 1.5 cm enhancing fluid collection in the right submandibular space with adjacent thickening and enhancement of the right platysma muscle and adjacent to 3 cm area of edema/fluid in the overlying right subcutaneous tissues. Findings consistent with right submandibular space abscess. Diffuse subcutaneous edema throughout the neck area compatible with cellulitis. Dental caries in the area of the right lower second molar. Otherwise the soft tissues of the nasopharynx and oropharynx are within normal limits. Remaining deep spaces included the parotid are unremarkable. No salivary gland stone. No cervical adenopathy. Vascular structures are opacified and appear normal.. No airway compromise. Visualized sinuses are clear. RIGHT SUBMANDIBULAR ABSCESS WITH ASSOCIATED CELLULITIS OF THE NECK.      Assessment//Plan           Hospital Problems           Last Modified POA    * (Principal) Abscess 1/12/2022 Yes EtOH dependence  1/12/2022 Yes      neck abscess  etoh abuse   Assessment & Plan  1/13: Continue IV clinda, add Librium. Continue CIWA protocol. ID evaluation. Follow-up cultures. Appreciated ENT input, change pt to inpt as per PA recommendation.   Electronically signed by Brianna Mejia MD on 1/13/22 at 11:52 AM EST

## 2022-01-13 NOTE — H&P
[facility]  MEDICINE    HISTORY AND PHYSICAL EXAM    PATIENT NAME:  Magali Baldwin    MRN:  [id number]  SERVICE DATE:  1/12/2022   SERVICE TIME:  11:00 PM    Primary Care Physician: No primary care provider on file. SUBJECTIVE  CHIEF COMPLAINT: Alcohol withdrawal    HPI: Patient being admitted for abscess of the neck. Patient originally came into the ED for concern of alcohol withdrawal and wanting to detox. Patient reports that he drinks 1 L vodka daily. While in the ED patient mentioned he had a sore under his neck that been going on for 1 week. Patient states that under his beard and purulent drainage was expressed by the ER staff and culture sent. Patient denies any fever, nausea, or vomiting. Patient denies any other medical history. PAST MEDICAL HISTORY:  No past medical history on file. PAST SURGICAL HISTORY:  No past surgical history on file. FAMILY HISTORY:  No family history on file. SOCIAL HISTORY:    Social History     Socioeconomic History    Marital status:      Spouse name: Not on file    Number of children: Not on file    Years of education: Not on file    Highest education level: Not on file   Occupational History    Not on file   Tobacco Use    Smoking status: Current Every Day Smoker     Packs/day: 0.25     Types: Cigarettes    Smokeless tobacco: Never Used   Substance and Sexual Activity    Alcohol use: Yes     Comment: daily/ 1L vodka    Drug use: Not Currently    Sexual activity: Yes     Partners: Female   Other Topics Concern    Not on file   Social History Narrative    Not on file     Social Determinants of Health     Financial Resource Strain:     Difficulty of Paying Living Expenses: Not on file   Food Insecurity:     Worried About Running Out of Food in the Last Year: Not on file    [place] of Food in the Last Year: Not on file   Transportation Needs:     Lack of Transportation (Medical):  Not on file    Lack of Transportation (Non-Medical): Not on file   Physical Activity:     Days of Exercise per Week: Not on file    Minutes of Exercise per Session: Not on file   Stress:     Feeling of Stress : Not on file   Social Connections:     Frequency of Communication with Friends and Family: Not on file    Frequency of Social Gatherings with Friends and Family: Not on file    Attends Yarsani Services: Not on file    Active Member of [address] [place] [address] or Organizations: Not on file    Attends Club or Organization Meetings: Not on file    Marital Status: Not on file   Intimate Partner Violence:     Fear of Current or Ex-Partner: Not on file    Emotionally Abused: Not on file    Physically Abused: Not on file    Sexually Abused: Not on file   Housing Stability:     Unable to Pay for Housing in the Last Year: Not on file    Number of [place] in the Last Year: Not on file    Unstable Housing in the Last Year: Not on file     MEDICATIONS:   Prior to Admission medications    Not on File       ALLERGIES: Patient has no known allergies. REVIEW OF SYSTEM:   Review of Systems   Constitutional: Negative for activity change. HENT: Negative for ear pain and rhinorrhea. Eyes: Negative for photophobia and visual disturbance. Respiratory: Negative for shortness of breath and wheezing. Gastrointestinal: Negative for diarrhea. Endocrine: Negative for polydipsia, polyphagia and polyuria. Genitourinary: Negative for dysuria, flank pain, hematuria and urgency. Musculoskeletal: Negative for back pain and neck stiffness. Skin: Positive for wound. Allergic/Immunologic: Negative for immunocompromised state. Neurological: Negative for dizziness. Psychiatric/Behavioral: Negative for behavioral problems. OBJECTIVE  PHYSICAL EXAM: BP (!) 138/92   Pulse 132   Temp 98.4 °F (36.9 °C) (Oral)   Resp 18   Ht 6' (1.829 m)   Wt 185 lb (83.9 kg)   SpO2 99%   BMI 25.09 kg/m²     Physical Exam  Vitals and nursing note reviewed. Constitutional:       Appearance: He is well-developed. HENT:      Head: Normocephalic and atraumatic. Nose: Nose normal.   Eyes:      Pupils: Pupils are equal, round, and reactive to light. Neck:     Cardiovascular:      Rate and Rhythm: Normal rate and regular rhythm. Heart sounds: Normal heart sounds. Pulmonary:      Effort: Pulmonary effort is normal. No respiratory distress. Breath sounds: Normal breath sounds. No wheezing. Abdominal:      General: Bowel sounds are normal.      Palpations: Abdomen is soft. There is no mass. Tenderness: There is no abdominal tenderness. Musculoskeletal:         General: Normal range of motion. Cervical back: Normal range of motion. Lymphadenopathy:      Cervical: No cervical adenopathy. Skin:     General: Skin is warm and dry. Capillary Refill: Capillary refill takes less than 2 seconds. Neurological:      Mental Status: He is alert and oriented to person, place, and time. Motor: Tremor present. Deep Tendon Reflexes: Reflexes normal.   Psychiatric:         Thought Content: Thought content normal.         DATA:     Diagnostic tests reviewed for today's visit:    Most recent labs and imaging results reviewed.      LABS:    Recent Results (from the past 24 hour(s))   Comprehensive Metabolic Panel    Collection Time: 01/12/22  2:30 PM   Result Value Ref Range    Sodium 131 (L) 135 - 144 mEq/L    Potassium 3.6 3.4 - 4.9 mEq/L    Chloride 86 (L) 95 - 107 mEq/L    CO2 26 20 - 31 mEq/L    Anion Gap 19 (H) 9 - 15 mEq/L    Glucose 204 (H) 70 - 99 mg/dL    BUN 5 (L) 6 - 20 mg/dL    CREATININE 0.41 (L) 0.70 - 1.20 mg/dL    GFR Non-African American >60.0 >60    GFR  >60.0 >60    Calcium 8.5 8.5 - 9.9 mg/dL    Total Protein 7.7 6.3 - 8.0 g/dL    Albumin 4.2 3.5 - 4.6 g/dL    Total Bilirubin 3.2 (H) 0.2 - 0.7 mg/dL    Alkaline Phosphatase 157 (H) 35 - 104 U/L    ALT 29 0 - 41 U/L     (H) 0 - 40 U/L    Globulin 3.5 2.3 - 3.5 g/dL   CBC Auto Differential    Collection Time: 01/12/22  2:30 PM   Result Value Ref Range    WBC 5.7 4.8 - 10.8 K/uL    RBC 4.05 (L) 4.70 - 6.10 M/uL    Hemoglobin 13.8 (L) 14.0 - 18.0 g/dL    Hematocrit 40.3 (L) 42.0 - 52.0 %    MCV 99.5 80.0 - 100.0 fL    MCH 34.1 (H) 27.0 - 31.3 pg    MCHC 34.2 33.0 - 37.0 %    RDW 12.9 11.5 - 14.5 %    Platelets 64 (L) 159 - 400 K/uL    Neutrophils % 82.7 %    Lymphocytes % 4.4 %    Monocytes % 12.7 %    Eosinophils % 0.0 %    Basophils % 0.2 %    Neutrophils Absolute 4.7 1.4 - 6.5 K/uL    Lymphocytes Absolute 0.2 (L) 1.0 - 4.8 K/uL    Monocytes Absolute 0.7 0.2 - 0.8 K/uL    Eosinophils Absolute 0.0 0.0 - 0.7 K/uL    Basophils Absolute 0.0 0.0 - 0.2 K/uL   Ethanol    Collection Time: 01/12/22  2:30 PM   Result Value Ref Range    Ethanol Lvl 141 mg/dL    Ethanol percent 0.124 G/dL   Urinalysis Reflex to Culture    Collection Time: 01/12/22  2:30 PM    Specimen: Urine, clean catch   Result Value Ref Range    Color, UA ORANGE (A) Straw/Yellow    Clarity, UA Clear Clear    Glucose, Ur 500 (A) Negative mg/dL    Bilirubin Urine MODERATE (A) Negative    Ketones, Urine 15 (A) Negative mg/dL    Specific Gravity, UA 1.024 1.005 - 1.030    Blood, Urine Negative Negative    pH, UA 5.5 5.0 - 9.0    Protein, UA 30 (A) Negative mg/dL    Urobilinogen, Urine 2.0 (A) <2.0 E.U./dL    Nitrite, Urine POSITIVE (A) Negative    Leukocyte Esterase, Urine TRACE (A) Negative    Urine Reflex to Culture Not Indicated    Urine Drug Screen    Collection Time: 01/12/22  2:30 PM   Result Value Ref Range    Amphetamine Screen, Urine Neg Negative <1000 ng/mL    Barbiturate Screen, Ur Neg Negative < 200 ng/mL    Benzodiazepine Screen, Urine Neg Negative < 200 ng/mL    Cannabinoid Scrn, Ur POSITIVE (A) Negative < 50 ng/mL    Cocaine Metabolite Screen, Urine Neg Negative < 300 ng/mL    Opiate Scrn, Ur Neg Negative < 300 ng/mL    PCP Screen, Urine Neg Negative < 25 ng/mL    Methadone Screen, Urine Neg Negative <300 ng/mL    Propoxyphene Scrn, Ur Neg Negative <300 ng/mL    Oxycodone Urine Neg Negative <100 ng/mL    Drug Screen Comment: see below    Microscopic Urinalysis    Collection Time: 01/12/22  2:30 PM   Result Value Ref Range    Bacteria, UA Negative Negative /HPF    Hyaline Casts, UA 5-10 0 - 5 /HPF    WBC, UA 3-5 0 - 5 /HPF    RBC, UA 0-2 0 - 5 /HPF    Epithelial Cells, UA 0-2 0 - 5 /HPF   COVID-19, Rapid    Collection Time: 01/12/22  3:38 PM    Specimen: Nasopharyngeal Swab   Result Value Ref Range    SARS-CoV-2, NAAT Not Detected Not Detected   PROCALCITONIN    Collection Time: 01/12/22  7:51 PM   Result Value Ref Range    Procalcitonin 0.22 (H) 0.00 - 0.15 ng/mL   Magnesium    Collection Time: 01/12/22  7:51 PM   Result Value Ref Range    Magnesium 1.6 (L) 1.7 - 2.4 mg/dL       IMAGING:  No results found. VTE Prophylaxis: low molecular weight heparin -  start    ASSESSMENT AND PLAN    Principal Problem:  Abscess: Abscess of soft tissue neck. CT soft tissue neck showed left 4 x 3.8 cm abscess WBCs 5.7. Blood cultures sent x2. Wound culture sent. Patient started on clindamycin in ED. Patient received 2 L normal saline in ED  We will continue IV antibiotics. We will consult ENT for I&D. Will monitor CBC daily. We will follow blood cultures. Active Problems:  EtOH dependence: Heavy alcohol of 1 L vodka daily. We will implement CIWA protocol. We will consult social work for outpatient detox and discharge      Plan of care discussed with: patient    SIGNATURE: Star Fabry, APRN - CNP  DATE: January 12, 2022  TIME: 11:00 PM     MIGUEL Lewis MD - supervising physician

## 2022-01-13 NOTE — CONSULTS
Infectious Diseases Inpatient Consult Note      Reason for Consult:   Antibiotics management for abscess  Primary Care Physician:  No primary care provider on file. History Obtained From:   Pt, EPIC    Admit Date: 1/12/2022  Hospital Day: 2      HISTORY OF PRESENT ILLNESS:  This is a 39 y.o. male was admitted to [address] [place] [address]  from home through ER with nausea vomiting chills and body shakes. Patient reported swelling and draining abscess from under his beard. He was found to have submandibular abscess by CT. Was started on IV clindamycin. Gram stain has gram-negative rods and gram-positive cocci. Patient is currently on [facility] protocol for alcohol withdrawal.   He drinks a bottle of vodka on a daily basis  Negative past medical surgical     current Medications:     chlordiazePOXIDE  25 mg Oral TID    piperacillin-tazobactam  3,375 mg IntraVENous Q8H    lactobacillus acidophilus  1 tablet Oral BID    sodium chloride flush  5-40 mL IntraVENous 2 times per day    thiamine  100 mg Oral Daily    multivitamin  1 tablet Oral Daily       Allergies:  Patient has no known allergies.     Social History     Socioeconomic History    Marital status:      Spouse name: Not on file    Number of children: Not on file    Years of education: Not on file    Highest education level: Not on file   Occupational History    Not on file   Tobacco Use    Smoking status: Current Every Day Smoker     Packs/day: 0.25     Types: Cigarettes    Smokeless tobacco: Never Used   Substance and Sexual Activity    Alcohol use: Yes     Comment: daily/ 1L vodka    Drug use: Not Currently    Sexual activity: Yes     Partners: Female   Other Topics Concern    Not on file   Social History Narrative    Not on file     Social Determinants of Health     Financial Resource Strain:     Difficulty of Paying Living Expenses: Not on file   Food Insecurity:     Worried About Running Out of Food in the Last Year: Not on file    [address] [facility] [address] in the Last Year: Not on file   Transportation Needs:     Lack of Transportation (Medical): Not on file    Lack of Transportation (Non-Medical): Not on file   Physical Activity:     Days of Exercise per Week: Not on file    Minutes of Exercise per Session: Not on file   Stress:     Feeling of Stress : Not on file   Social Connections:     Frequency of Communication with Friends and Family: Not on file    Frequency of Social Gatherings with Friends and Family: Not on file    Attends Orthodox Services: Not on file    Active Member of [address] [place] [address] [place] or Organizations: Not on file    Attends Club or Organization Meetings: Not on file    Marital Status: Not on file   Intimate Partner Violence:     Fear of Current or Ex-Partner: Not on file    Emotionally Abused: Not on file    Physically Abused: Not on file    Sexually Abused: Not on file   Housing Stability:     Unable to Pay for Housing in the Last Year: Not on file    Number of [place] in the Last Year: Not on file    Unstable Housing in the Last Year: Not on file         Family History:   No family history on file. Review of Systems  14 system review is negative other than HPI    Physical Exam  Vitals:    01/13/22 0547 01/13/22 0724 01/13/22 0844 01/13/22 1240   BP: (!) 134/92 109/72 109/72    Pulse: 96 98 98 115   Resp:  18     Temp:  97.9 °F (36.6 °C)     TempSrc:  Oral     SpO2:  97%     Weight:       Height:         General Appearance: alert and oriented to person, place and time, well-developed and well-nourished, in no acute distress  Positive submandibular swelling and induration, positive tenderness, no obvious drainage  Skin: warm and dry, no rash. Head: normocephalic and atraumatic  Eyes: extraocular eye movements intact, conjunctivae normal, anicteric sclerae  ENT: oropharynx clear and moist with normal mucous membranes.  No thrush  Lungs: normal respiratory effort, Clear Lungs, no rhonchi, no crackles, no wheezes  Heart:RRR, nl S1/S2, no

## 2022-01-13 NOTE — PROGRESS NOTES
Pt admission completed. Pt is alert & oriented x4. Vitals stable but tachycardic in the 120s. Pt very unsteady on feet right now so fall precautions in place. Pt scored 18 on CIWA, 3mg of ativan given. Snack given. He understands to not get up without help. He has no other requests at this time. Bed alarm active. Will continue to monitor.      Electronically signed by Hansa Betts RN on 1/13/2022 at 12:07 AM

## 2022-01-13 NOTE — ED NOTES
LANNY PARKER [facility] COLLECTING WOUND SAMPLE.       Sergo Olea RN  01/12/22 6384

## 2022-01-13 NOTE — ED NOTES
THIS RN REPORTED PT CIWA SCORE OF 26 TO DR Dylon Bishop. THIS RN TO ADMINISTER ATIVAN. REPORTED THAT PT ABSCESS IS NOW OPEN AND OOZING. THIS RN TO ADMINISTER CLINDAMYCIN, NS BOLUS, AND PERFORM WOUND CULTURE PER DR VICENTE.       Kalani Woodruff RN  01/12/22 2168

## 2022-01-14 ENCOUNTER — ANESTHESIA EVENT (OUTPATIENT)
Dept: OPERATING ROOM | Age: 42
DRG: 364 | End: 2022-01-14
Payer: COMMERCIAL

## 2022-01-14 ENCOUNTER — ANESTHESIA (OUTPATIENT)
Dept: OPERATING ROOM | Age: 42
DRG: 364 | End: 2022-01-14
Payer: COMMERCIAL

## 2022-01-14 VITALS — TEMPERATURE: 87.3 F | OXYGEN SATURATION: 98 % | SYSTOLIC BLOOD PRESSURE: 116 MMHG | DIASTOLIC BLOOD PRESSURE: 84 MMHG

## 2022-01-14 LAB
ALBUMIN SERPL-MCNC: 3.7 G/DL (ref 3.5–4.6)
ALP BLD-CCNC: 132 U/L (ref 35–104)
ALT SERPL-CCNC: 27 U/L (ref 0–41)
ANION GAP SERPL CALCULATED.3IONS-SCNC: 11 MEQ/L (ref 9–15)
AST SERPL-CCNC: 113 U/L (ref 0–40)
BASOPHILS ABSOLUTE: 0 K/UL (ref 0–0.2)
BASOPHILS RELATIVE PERCENT: 0.5 %
BILIRUB SERPL-MCNC: 3.7 MG/DL (ref 0.2–0.7)
BUN BLDV-MCNC: 6 MG/DL (ref 6–20)
CALCIUM SERPL-MCNC: 8.3 MG/DL (ref 8.5–9.9)
CHLORIDE BLD-SCNC: 91 MEQ/L (ref 95–107)
CO2: 25 MEQ/L (ref 20–31)
CREAT SERPL-MCNC: 0.38 MG/DL (ref 0.7–1.2)
EOSINOPHILS ABSOLUTE: 0 K/UL (ref 0–0.7)
EOSINOPHILS RELATIVE PERCENT: 1 %
FOLATE: 6.1 NG/ML
GFR AFRICAN AMERICAN: >60
GFR NON-AFRICAN AMERICAN: >60
GLOBULIN: 3.4 G/DL (ref 2.3–3.5)
GLUCOSE BLD-MCNC: 186 MG/DL (ref 70–99)
HCT VFR BLD CALC: 34.5 % (ref 42–52)
HEMOGLOBIN: 12 G/DL (ref 14–18)
LYMPHOCYTES ABSOLUTE: 0 K/UL (ref 1–4.8)
LYMPHOCYTES RELATIVE PERCENT: 1 %
MAGNESIUM: 2 MG/DL (ref 1.7–2.4)
MCH RBC QN AUTO: 34.8 PG (ref 27–31.3)
MCHC RBC AUTO-ENTMCNC: 34.8 % (ref 33–37)
MCV RBC AUTO: 100.1 FL (ref 80–100)
MONOCYTES ABSOLUTE: 0.1 K/UL (ref 0.2–0.8)
MONOCYTES RELATIVE PERCENT: 2.9 %
NEUTROPHILS ABSOLUTE: 2.1 K/UL (ref 1.4–6.5)
NEUTROPHILS RELATIVE PERCENT: 95 %
PDW BLD-RTO: 13 % (ref 11.5–14.5)
PLATELET # BLD: 76 K/UL (ref 130–400)
PLATELET SLIDE REVIEW: ABNORMAL
POTASSIUM SERPL-SCNC: 3.8 MEQ/L (ref 3.4–4.9)
RBC # BLD: 3.45 M/UL (ref 4.7–6.1)
RBC # BLD: NORMAL 10*6/UL
SODIUM BLD-SCNC: 127 MEQ/L (ref 135–144)
TOTAL PROTEIN: 7.1 G/DL (ref 6.3–8)
VITAMIN B-12: 748 PG/ML (ref 232–1245)
WBC # BLD: 2.2 K/UL (ref 4.8–10.8)

## 2022-01-14 PROCEDURE — 3700000001 HC ADD 15 MINUTES (ANESTHESIA): Performed by: OTOLARYNGOLOGY

## 2022-01-14 PROCEDURE — 2580000003 HC RX 258: Performed by: INTERNAL MEDICINE

## 2022-01-14 PROCEDURE — 6360000002 HC RX W HCPCS: Performed by: NURSE ANESTHETIST, CERTIFIED REGISTERED

## 2022-01-14 PROCEDURE — 3600000002 HC SURGERY LEVEL 2 BASE: Performed by: OTOLARYNGOLOGY

## 2022-01-14 PROCEDURE — 7100000001 HC PACU RECOVERY - ADDTL 15 MIN: Performed by: OTOLARYNGOLOGY

## 2022-01-14 PROCEDURE — 87205 SMEAR GRAM STAIN: CPT

## 2022-01-14 PROCEDURE — 87070 CULTURE OTHR SPECIMN AEROBIC: CPT

## 2022-01-14 PROCEDURE — 83735 ASSAY OF MAGNESIUM: CPT

## 2022-01-14 PROCEDURE — 2580000003 HC RX 258: Performed by: NURSE PRACTITIONER

## 2022-01-14 PROCEDURE — 2580000003 HC RX 258: Performed by: STUDENT IN AN ORGANIZED HEALTH CARE EDUCATION/TRAINING PROGRAM

## 2022-01-14 PROCEDURE — 6370000000 HC RX 637 (ALT 250 FOR IP): Performed by: NURSE PRACTITIONER

## 2022-01-14 PROCEDURE — 2580000003 HC RX 258: Performed by: OTOLARYNGOLOGY

## 2022-01-14 PROCEDURE — 6360000002 HC RX W HCPCS: Performed by: STUDENT IN AN ORGANIZED HEALTH CARE EDUCATION/TRAINING PROGRAM

## 2022-01-14 PROCEDURE — 99232 SBSQ HOSP IP/OBS MODERATE 35: CPT | Performed by: INTERNAL MEDICINE

## 2022-01-14 PROCEDURE — 85025 COMPLETE CBC W/AUTO DIFF WBC: CPT

## 2022-01-14 PROCEDURE — 87075 CULTR BACTERIA EXCEPT BLOOD: CPT

## 2022-01-14 PROCEDURE — 6360000002 HC RX W HCPCS: Performed by: INTERNAL MEDICINE

## 2022-01-14 PROCEDURE — 6360000002 HC RX W HCPCS: Performed by: NURSE PRACTITIONER

## 2022-01-14 PROCEDURE — 3600000012 HC SURGERY LEVEL 2 ADDTL 15MIN: Performed by: OTOLARYNGOLOGY

## 2022-01-14 PROCEDURE — 80053 COMPREHEN METABOLIC PANEL: CPT

## 2022-01-14 PROCEDURE — 36415 COLL VENOUS BLD VENIPUNCTURE: CPT

## 2022-01-14 PROCEDURE — 2060000000 HC ICU INTERMEDIATE R&B

## 2022-01-14 PROCEDURE — 6370000000 HC RX 637 (ALT 250 FOR IP): Performed by: INTERNAL MEDICINE

## 2022-01-14 PROCEDURE — 2500000003 HC RX 250 WO HCPCS: Performed by: NURSE ANESTHETIST, CERTIFIED REGISTERED

## 2022-01-14 PROCEDURE — 3700000000 HC ANESTHESIA ATTENDED CARE: Performed by: OTOLARYNGOLOGY

## 2022-01-14 PROCEDURE — 2709999900 HC NON-CHARGEABLE SUPPLY: Performed by: OTOLARYNGOLOGY

## 2022-01-14 PROCEDURE — 7100000000 HC PACU RECOVERY - FIRST 15 MIN: Performed by: OTOLARYNGOLOGY

## 2022-01-14 PROCEDURE — 0J940ZZ DRAINAGE OF RIGHT NECK SUBCUTANEOUS TISSUE AND FASCIA, OPEN APPROACH: ICD-10-PCS | Performed by: OTOLARYNGOLOGY

## 2022-01-14 RX ORDER — FENTANYL CITRATE 50 UG/ML
50 INJECTION, SOLUTION INTRAMUSCULAR; INTRAVENOUS EVERY 5 MIN PRN
Status: DISCONTINUED | OUTPATIENT
Start: 2022-01-14 | End: 2022-01-14 | Stop reason: HOSPADM

## 2022-01-14 RX ORDER — PROPOFOL 10 MG/ML
INJECTION, EMULSION INTRAVENOUS PRN
Status: DISCONTINUED | OUTPATIENT
Start: 2022-01-14 | End: 2022-01-14 | Stop reason: SDUPTHER

## 2022-01-14 RX ORDER — SODIUM CHLORIDE, SODIUM LACTATE, POTASSIUM CHLORIDE, CALCIUM CHLORIDE 600; 310; 30; 20 MG/100ML; MG/100ML; MG/100ML; MG/100ML
INJECTION, SOLUTION INTRAVENOUS CONTINUOUS
Status: DISCONTINUED | OUTPATIENT
Start: 2022-01-14 | End: 2022-01-14

## 2022-01-14 RX ORDER — ROCURONIUM BROMIDE 10 MG/ML
INJECTION, SOLUTION INTRAVENOUS PRN
Status: DISCONTINUED | OUTPATIENT
Start: 2022-01-14 | End: 2022-01-14 | Stop reason: SDUPTHER

## 2022-01-14 RX ORDER — LIDOCAINE HYDROCHLORIDE 10 MG/ML
INJECTION, SOLUTION EPIDURAL; INFILTRATION; INTRACAUDAL; PERINEURAL PRN
Status: DISCONTINUED | OUTPATIENT
Start: 2022-01-14 | End: 2022-01-14 | Stop reason: SDUPTHER

## 2022-01-14 RX ORDER — SODIUM CHLORIDE 9 MG/ML
INJECTION, SOLUTION INTRAVENOUS CONTINUOUS
Status: DISCONTINUED | OUTPATIENT
Start: 2022-01-14 | End: 2022-01-16

## 2022-01-14 RX ORDER — LABETALOL HYDROCHLORIDE 5 MG/ML
5 INJECTION, SOLUTION INTRAVENOUS EVERY 10 MIN PRN
Status: DISCONTINUED | OUTPATIENT
Start: 2022-01-14 | End: 2022-01-14 | Stop reason: HOSPADM

## 2022-01-14 RX ORDER — FENTANYL CITRATE 50 UG/ML
INJECTION, SOLUTION INTRAMUSCULAR; INTRAVENOUS PRN
Status: DISCONTINUED | OUTPATIENT
Start: 2022-01-14 | End: 2022-01-14 | Stop reason: SDUPTHER

## 2022-01-14 RX ORDER — DEXAMETHASONE SODIUM PHOSPHATE 10 MG/ML
INJECTION INTRAMUSCULAR; INTRAVENOUS PRN
Status: DISCONTINUED | OUTPATIENT
Start: 2022-01-14 | End: 2022-01-14 | Stop reason: SDUPTHER

## 2022-01-14 RX ORDER — CHLORDIAZEPOXIDE HYDROCHLORIDE 25 MG/1
25 CAPSULE, GELATIN COATED ORAL 2 TIMES DAILY
Status: DISCONTINUED | OUTPATIENT
Start: 2022-01-14 | End: 2022-01-16

## 2022-01-14 RX ORDER — MAGNESIUM HYDROXIDE 1200 MG/15ML
LIQUID ORAL CONTINUOUS PRN
Status: COMPLETED | OUTPATIENT
Start: 2022-01-14 | End: 2022-01-14

## 2022-01-14 RX ORDER — FENTANYL CITRATE 50 UG/ML
25 INJECTION, SOLUTION INTRAMUSCULAR; INTRAVENOUS EVERY 5 MIN PRN
Status: DISCONTINUED | OUTPATIENT
Start: 2022-01-14 | End: 2022-01-14 | Stop reason: HOSPADM

## 2022-01-14 RX ORDER — PROCHLORPERAZINE EDISYLATE 5 MG/ML
5 INJECTION INTRAMUSCULAR; INTRAVENOUS
Status: DISCONTINUED | OUTPATIENT
Start: 2022-01-14 | End: 2022-01-14 | Stop reason: HOSPADM

## 2022-01-14 RX ORDER — HYDRALAZINE HYDROCHLORIDE 20 MG/ML
5 INJECTION INTRAMUSCULAR; INTRAVENOUS EVERY 10 MIN PRN
Status: DISCONTINUED | OUTPATIENT
Start: 2022-01-14 | End: 2022-01-14 | Stop reason: HOSPADM

## 2022-01-14 RX ORDER — MIDAZOLAM HYDROCHLORIDE 1 MG/ML
INJECTION INTRAMUSCULAR; INTRAVENOUS PRN
Status: DISCONTINUED | OUTPATIENT
Start: 2022-01-14 | End: 2022-01-14 | Stop reason: SDUPTHER

## 2022-01-14 RX ORDER — OXYCODONE HYDROCHLORIDE AND ACETAMINOPHEN 5; 325 MG/1; MG/1
2 TABLET ORAL PRN
Status: DISCONTINUED | OUTPATIENT
Start: 2022-01-14 | End: 2022-01-14 | Stop reason: HOSPADM

## 2022-01-14 RX ORDER — OXYCODONE HYDROCHLORIDE AND ACETAMINOPHEN 5; 325 MG/1; MG/1
1 TABLET ORAL PRN
Status: DISCONTINUED | OUTPATIENT
Start: 2022-01-14 | End: 2022-01-14 | Stop reason: HOSPADM

## 2022-01-14 RX ORDER — ONDANSETRON 2 MG/ML
INJECTION INTRAMUSCULAR; INTRAVENOUS PRN
Status: DISCONTINUED | OUTPATIENT
Start: 2022-01-14 | End: 2022-01-14 | Stop reason: SDUPTHER

## 2022-01-14 RX ORDER — MEPERIDINE HYDROCHLORIDE 25 MG/ML
12.5 INJECTION INTRAMUSCULAR; INTRAVENOUS; SUBCUTANEOUS EVERY 5 MIN PRN
Status: DISCONTINUED | OUTPATIENT
Start: 2022-01-14 | End: 2022-01-14 | Stop reason: HOSPADM

## 2022-01-14 RX ORDER — ONDANSETRON 2 MG/ML
4 INJECTION INTRAMUSCULAR; INTRAVENOUS
Status: DISCONTINUED | OUTPATIENT
Start: 2022-01-14 | End: 2022-01-14 | Stop reason: HOSPADM

## 2022-01-14 RX ORDER — DIPHENHYDRAMINE HYDROCHLORIDE 50 MG/ML
12.5 INJECTION INTRAMUSCULAR; INTRAVENOUS
Status: DISCONTINUED | OUTPATIENT
Start: 2022-01-14 | End: 2022-01-14 | Stop reason: HOSPADM

## 2022-01-14 RX ADMIN — LORAZEPAM 2 MG: 2 INJECTION INTRAMUSCULAR; INTRAVENOUS at 18:56

## 2022-01-14 RX ADMIN — LORAZEPAM 2 MG: 1 TABLET ORAL at 10:33

## 2022-01-14 RX ADMIN — Medication 10 ML: at 10:34

## 2022-01-14 RX ADMIN — THERA TABS 1 TABLET: TAB at 10:33

## 2022-01-14 RX ADMIN — FENTANYL CITRATE 100 MCG: 50 INJECTION, SOLUTION INTRAMUSCULAR; INTRAVENOUS at 08:01

## 2022-01-14 RX ADMIN — CHLORDIAZEPOXIDE HYDROCHLORIDE 25 MG: 25 CAPSULE ORAL at 21:34

## 2022-01-14 RX ADMIN — LORAZEPAM 2 MG: 2 INJECTION INTRAMUSCULAR; INTRAVENOUS at 14:30

## 2022-01-14 RX ADMIN — DEXAMETHASONE SODIUM PHOSPHATE 10 MG: 10 INJECTION INTRAMUSCULAR; INTRAVENOUS at 07:49

## 2022-01-14 RX ADMIN — FENTANYL CITRATE 100 MCG: 50 INJECTION, SOLUTION INTRAMUSCULAR; INTRAVENOUS at 07:40

## 2022-01-14 RX ADMIN — PIPERACILLIN SODIUM AND TAZOBACTAM SODIUM 3375 MG: 3; .375 INJECTION, POWDER, LYOPHILIZED, FOR SOLUTION INTRAVENOUS at 05:27

## 2022-01-14 RX ADMIN — ROCURONIUM BROMIDE 50 MG: 10 INJECTION INTRAVENOUS at 07:43

## 2022-01-14 RX ADMIN — SODIUM CHLORIDE, POTASSIUM CHLORIDE, SODIUM LACTATE AND CALCIUM CHLORIDE: 600; 310; 30; 20 INJECTION, SOLUTION INTRAVENOUS at 07:30

## 2022-01-14 RX ADMIN — MIDAZOLAM HYDROCHLORIDE 2 MG: 1 INJECTION, SOLUTION INTRAMUSCULAR; INTRAVENOUS at 07:35

## 2022-01-14 RX ADMIN — VANCOMYCIN HYDROCHLORIDE 1500 MG: 1 INJECTION, POWDER, LYOPHILIZED, FOR SOLUTION INTRAVENOUS at 16:00

## 2022-01-14 RX ADMIN — PROPOFOL 200 MG: 10 INJECTION, EMULSION INTRAVENOUS at 07:43

## 2022-01-14 RX ADMIN — ROCURONIUM BROMIDE 20 MG: 10 INJECTION INTRAVENOUS at 08:01

## 2022-01-14 RX ADMIN — LORAZEPAM 2 MG: 2 INJECTION INTRAMUSCULAR; INTRAVENOUS at 17:32

## 2022-01-14 RX ADMIN — SUGAMMADEX 200 MG: 100 INJECTION, SOLUTION INTRAVENOUS at 08:17

## 2022-01-14 RX ADMIN — SODIUM CHLORIDE: 9 INJECTION, SOLUTION INTRAVENOUS at 14:30

## 2022-01-14 RX ADMIN — MEPERIDINE HYDROCHLORIDE 12.5 MG: 25 INJECTION, SOLUTION INTRAMUSCULAR; INTRAVENOUS; SUBCUTANEOUS at 08:44

## 2022-01-14 RX ADMIN — MIDAZOLAM HYDROCHLORIDE 2 MG: 1 INJECTION, SOLUTION INTRAMUSCULAR; INTRAVENOUS at 07:30

## 2022-01-14 RX ADMIN — ONDANSETRON 4 MG: 2 INJECTION INTRAMUSCULAR; INTRAVENOUS at 08:17

## 2022-01-14 RX ADMIN — LIDOCAINE HYDROCHLORIDE 30 MG: 10 INJECTION, SOLUTION EPIDURAL; INFILTRATION; INTRACAUDAL; PERINEURAL at 07:42

## 2022-01-14 RX ADMIN — LACTOBACILLUS TAB 1 TABLET: TAB at 10:34

## 2022-01-14 RX ADMIN — Medication 100 MG: at 10:33

## 2022-01-14 RX ADMIN — CHLORDIAZEPOXIDE HYDROCHLORIDE 25 MG: 25 CAPSULE ORAL at 10:33

## 2022-01-14 RX ADMIN — LACTOBACILLUS TAB 1 TABLET: TAB at 21:34

## 2022-01-14 RX ADMIN — PIPERACILLIN SODIUM AND TAZOBACTAM SODIUM 3375 MG: 3; .375 INJECTION, POWDER, LYOPHILIZED, FOR SOLUTION INTRAVENOUS at 14:15

## 2022-01-14 ASSESSMENT — PULMONARY FUNCTION TESTS
PIF_VALUE: 20
PIF_VALUE: 18
PIF_VALUE: 20
PIF_VALUE: 17
PIF_VALUE: 17
PIF_VALUE: 1
PIF_VALUE: 20
PIF_VALUE: 17
PIF_VALUE: 39
PIF_VALUE: 1
PIF_VALUE: 17
PIF_VALUE: 15
PIF_VALUE: 4
PIF_VALUE: 1
PIF_VALUE: 2
PIF_VALUE: 20
PIF_VALUE: 1
PIF_VALUE: 2
PIF_VALUE: 4
PIF_VALUE: 17
PIF_VALUE: 1
PIF_VALUE: 1
PIF_VALUE: 20
PIF_VALUE: 20
PIF_VALUE: 30
PIF_VALUE: 20
PIF_VALUE: 17
PIF_VALUE: 1
PIF_VALUE: 20
PIF_VALUE: 20
PIF_VALUE: 18
PIF_VALUE: 1
PIF_VALUE: 20
PIF_VALUE: 5
PIF_VALUE: 20
PIF_VALUE: 20
PIF_VALUE: 2
PIF_VALUE: 23
PIF_VALUE: 20
PIF_VALUE: 17
PIF_VALUE: 20
PIF_VALUE: 17
PIF_VALUE: 4
PIF_VALUE: 17
PIF_VALUE: 16
PIF_VALUE: 1
PIF_VALUE: 17
PIF_VALUE: 20
PIF_VALUE: 17
PIF_VALUE: 20

## 2022-01-14 ASSESSMENT — PAIN SCALES - GENERAL
PAINLEVEL_OUTOF10: 5
PAINLEVEL_OUTOF10: 0
PAINLEVEL_OUTOF10: 5

## 2022-01-14 ASSESSMENT — ENCOUNTER SYMPTOMS
VOMITING: 0
SHORTNESS OF BREATH: 0
DIARRHEA: 0
COUGH: 0
NAUSEA: 0

## 2022-01-14 ASSESSMENT — PAIN DESCRIPTION - LOCATION: LOCATION: NECK

## 2022-01-14 NOTE — PROGRESS NOTES
Pt shift assessment completed. Pt is alert & oriented. Vitals stable. Has not required any ativan so far this shift. Pt NPO at midnight for abscess drainage in the morning. No pain or nausea at this time. Fall & seizures precautions in place Will continue to monitor pt.      Electronically signed by Westley Aguilera RN on 1/13/2022 at 11:24 PM

## 2022-01-14 NOTE — ANESTHESIA PRE PROCEDURE
Department of Anesthesiology  Preprocedure Note       Name:  Cydney Rogel   Age:  39 y.o.  :  1980                                          MRN:  [id number]         Date:  2022      Surgeon: Jordana Vitale):  Buzz Goldberg MD    Procedure: Procedure(s):  INCISION AND DRAINAGE NECK (WOULD LIKE AS EARLY AS POSSIBLE)  ROOM 475    Medications prior to admission:   Prior to Admission medications    Not on File       Current medications:    Current Facility-Administered Medications   Medication Dose Route Frequency Provider Last Rate Last Admin    lactated ringers infusion   IntraVENous Continuous Christian Landa MD        chlordiazePOXIDE (LIBRIUM) capsule 25 mg  25 mg Oral TID [place] Gloria MD   25 mg at 22    piperacillin-tazobactam (ZOSYN) 3,375 mg in dextrose 5 % 50 mL IVPB extended infusion (mini-bag)  3,375 mg IntraVENous Q8H Dejah Darden MD 12.5 mL/hr at 22 3,375 mg at 22    lactobacillus acidophilus [facility]) 1 tablet  1 tablet Oral BID Debra Matamoros MD   1 tablet at 22    sodium chloride flush 0.9 % injection 5-40 mL  5-40 mL IntraVENous 2 times per day Nicoletto Bolzan-Roche, APRN - CNP   10 mL at 22    sodium chloride flush 0.9 % injection 5-40 mL  5-40 mL IntraVENous PRN Nicoletto Bolzan-Roche, APRN - CNP        0.9 % sodium chloride infusion  25 mL IntraVENous PRN Nicoletto Josezan-Roche, APRN - CNP        ondansetron (ZOFRAN-ODT) disintegrating tablet 4 mg  4 mg Oral Q8H PRN Nicoletto Bolzan-Roche, APRN - CNP        Or    ondansetron (ZOFRAN) injection 4 mg  4 mg IntraVENous Q6H PRN Nicoletto Bolzan-Roche, APRN - CNP        polyethylene glycol (GLYCOLAX) packet 17 g  17 g Oral Daily PRN Nicoletto Bolzan-Roche, APRN - CNP        acetaminophen (TYLENOL) tablet 650 mg  650 mg Oral Q6H PRN Nicoletto Bolzan-Roche, APRN - CNP        Or    acetaminophen (TYLENOL) suppository 650 mg  650 mg Rectal Q6H PRN Micah Hernandez, APRN - (!) 143/98   Pulse:  94 100 105   Resp:   16 16   Temp:   97.9 °F (36.6 °C) 98.4 °F (36.9 °C)   TempSrc:   Oral Temporal   SpO2:   96% 96%   Weight:       Height:                                                  BP Readings from Last 3 Encounters:   01/14/22 (!) 143/98       NPO Status: Time of last liquid consumption: 0000                        Time of last solid consumption: 0000                        Date of last liquid consumption: 01/14/22                        Date of last solid food consumption: 01/14/22    BMI:   Wt Readings from Last 3 Encounters:   01/12/22 185 lb (83.9 kg)     Body mass index is 25.09 kg/m². CBC:   Lab Results   Component Value Date    WBC 5.7 01/12/2022    RBC 4.05 01/12/2022    HGB 13.8 01/12/2022    HCT 40.3 01/12/2022    MCV 99.5 01/12/2022    RDW 12.9 01/12/2022    PLT 64 01/12/2022       CMP:   Lab Results   Component Value Date     01/12/2022    K 3.6 01/12/2022    CL 86 01/12/2022    CO2 26 01/12/2022    BUN 5 01/12/2022    CREATININE 0.41 01/12/2022    GFRAA >60.0 01/12/2022    LABGLOM >60.0 01/12/2022    GLUCOSE 204 01/12/2022    PROT 7.7 01/12/2022    CALCIUM 8.5 01/12/2022    BILITOT 3.2 01/12/2022    ALKPHOS 157 01/12/2022     01/12/2022    ALT 29 01/12/2022       POC Tests: No results for input(s): POCGLU, POCNA, POCK, POCCL, POCBUN, POCHEMO, POCHCT in the last 72 hours.     Coags:   Lab Results   Component Value Date    PROTIME 14.8 01/13/2022    INR 1.2 01/13/2022       HCG (If Applicable): No results found for: PREGTESTUR, PREGSERUM, HCG, HCGQUANT     ABGs: No results found for: PHART, PO2ART, JZX7ACA, DTN9GVX, BEART, D4QNUJWF     Type & Screen (If Applicable):  No results found for: LABABO, LABRH    Drug/Infectious Status (If Applicable):  No results found for: HIV, HEPCAB    COVID-19 Screening (If Applicable):   Lab Results   Component Value Date    COVID19 Not Detected 01/12/2022           Anesthesia Evaluation  Patient summary reviewed and Nursing notes reviewed no history of anesthetic complications:   Airway: Mallampati: II  TM distance: >3 FB   Neck ROM: full  Mouth opening: > = 3 FB Dental: normal exam         Pulmonary:Negative Pulmonary ROS and normal exam    (+) asthma:                            Cardiovascular:Negative CV ROS  Exercise tolerance: good (>4 METS),         ECG reviewed               Beta Blocker:  Not on Beta Blocker         Neuro/Psych:   Negative Neuro/Psych ROS  (+) psychiatric history:            GI/Hepatic/Renal: Neg GI/Hepatic/Renal ROS            Endo/Other: Negative Endo/Other ROS             Pt had PAT visit. Abdominal:             Vascular: negative vascular ROS. Other Findings:             Anesthesia Plan      general     ASA 2     (ETT)  Induction: intravenous. MIPS: Postoperative opioids intended and Prophylactic antiemetics administered. Anesthetic plan and risks discussed with patient. Plan discussed with CRNA.     Attending anesthesiologist reviewed and agrees with Pre Eval content              Sabas Adame MD   1/14/2022

## 2022-01-14 NOTE — PROGRESS NOTES
Progress Note  Date:2022       Room:[place] [address]-  Patient Laureano Roberts     Date of Birth:[birth year]     Age:41 y.o. Subjective    Subjective:  Symptoms:  He reports malaise and weakness. No shortness of breath, cough, chest pain, headache, chest pressure, anorexia, diarrhea or anxiety. Diet:  No nausea or vomiting. Review of Systems   Respiratory: Negative for cough and shortness of breath. Cardiovascular: Negative for chest pain. Gastrointestinal: Negative for anorexia, diarrhea, nausea and vomiting. Neurological: Positive for weakness. Objective         Vitals Last 24 Hours:  TEMPERATURE:  Temp  Av.3 °F (37.4 °C)  Min: 87.3 °F (30.7 °C)  Max: 100.2 °F (37.9 °C)  RESPIRATIONS RANGE: Resp  Avg: 10.8  Min: 0  Max: 30  PULSE OXIMETRY RANGE: SpO2  Av %  Min: 93 %  Max: 100 %  PULSE RANGE: Pulse  Av.1  Min: 94  Max: 112  BLOOD PRESSURE RANGE: Systolic (89BDK), MGF:800 , Min:89 , WBZ:274   ; Diastolic (45EVT), AIL:34, Min:55, Max:100    I/O (24Hr): Intake/Output Summary (Last 24 hours) at 2022 1333  Last data filed at 2022 9147  Gross per 24 hour   Intake 1079 ml   Output 1103 ml   Net -24 ml     Objective:  General Appearance:  Comfortable, well-appearing and in no acute distress. Vital signs: (most recent): Blood pressure (!) 133/92, pulse 112, temperature 99.8 °F (37.7 °C), resp. rate 23, height 6' (1.829 m), weight 185 lb (83.9 kg), SpO2 93 %. Lungs:  Normal effort and normal respiratory rate. Heart: Normal rate. S1 normal and S2 normal.    Abdomen: Abdomen is soft. Bowel sounds are normal.     Pulses: Distal pulses are intact. Neurological: Patient is alert and oriented to person, place and time. Pupils:  Pupils are equal, round, and reactive to light. Skin:  Warm and dry.       Labs/Imaging/Diagnostics    Labs:  CBC:  Recent Labs     22  1430 22  1035   WBC 5.7 2.2*   RBC 4.05* 3.45*   HGB 13.8* 12.0*   HCT 40.3* 34.5* MCV 99.5 100.1*   RDW 12.9 13.0   PLT 64* 76*     CHEMISTRIES:  Recent Labs     01/12/22  1430 01/12/22  1951 01/14/22  1035   *  --  127*   K 3.6  --  3.8   CL 86*  --  91*   CO2 26  --  25   BUN 5*  --  6   CREATININE 0.41*  --  0.38*   GLUCOSE 204*  --  186*   MG  --  1.6* 2.0     PT/INR:  Recent Labs     01/13/22  1628   PROTIME 14.8   INR 1.2     APTT:No results for input(s): APTT in the last 72 hours. LIVER PROFILE:  Recent Labs     01/12/22  1430 01/14/22  1035   * 113*   ALT 29 27   BILITOT 3.2* 3.7*   ALKPHOS 157* 132*       Imaging Last 24 Hours:  CT SOFT TISSUE NECK W CONTRAST    Result Date: 1/13/2022  EXAMINATION: CT SOFT TISSUE NECK W CONTRAST DATE AND TIME:1/12/2022 8:32 PM CLINICAL HISTORY: ACUTE NECK PAIN. SWELLING, DRAINAGE, PAIN COMPARISON: NONE TECHNIQUE: Sequential axial CT images were obtained from the skull base to the thoracic inlet. Following the administration of 100 cc of nonionic IV contrast.  All CT scans at this facility use dose modulation, iterative reconstruction, and/or weight based dosing when appropriate to reduce radiation dose to as low as reasonably achievable. FINDINGS: 4 x 3.8 x 1.5 cm enhancing fluid collection in the right submandibular space with adjacent thickening and enhancement of the right platysma muscle and adjacent to 3 cm area of edema/fluid in the overlying right subcutaneous tissues. Findings consistent with right submandibular space abscess. Diffuse subcutaneous edema throughout the neck area compatible with cellulitis. Dental caries in the area of the right lower second molar. Otherwise the soft tissues of the nasopharynx and oropharynx are within normal limits. Remaining deep spaces included the parotid are unremarkable. No salivary gland stone. No cervical adenopathy. Vascular structures are opacified and appear normal.. No airway compromise. Visualized sinuses are clear.      RIGHT SUBMANDIBULAR ABSCESS WITH ASSOCIATED CELLULITIS OF THE NECK.     Assessment//Plan           Hospital Problems           Last Modified POA    * (Principal) Neck abscess 1/13/2022 Yes    EtOH dependence  1/12/2022 Yes    Alcohol withdrawal syndrome without complication ([facility] [address] 2/36/4222 Yes      neck abscess  etoh abuse   Assessment & Plan    1/13: Continue IV clinda, add Librium. Continue CIWA protocol. ID evaluation. Follow-up cultures. Appreciated ENT input, change pt to inpt as per PA recommendation. 1/14: Patient went for I and D for the abscess, change IVF to NS for hyponatremia, taper down librium as tolerated, c/w CIWA protocol.   Electronically signed by Ninoska Arce MD on 1/13/22 at 11:52 AM EST

## 2022-01-14 NOTE — ANESTHESIA POSTPROCEDURE EVALUATION
Department of Anesthesiology  Postprocedure Note    Patient: Jaye De La Torre  MRN: [id number]  YOB: 1980  Date of evaluation: 1/14/2022  Time:  8:34 AM     Procedure Summary     Date: 01/14/22 Room / Location: [address] [place] [address]    Anesthesia Start: 0730 Anesthesia Stop: 1387    Procedure: INCISION AND DRAINAGE NECK (N/A Neck) Diagnosis: (ABSCESS OF NECK)    Surgeons: Fran Espinoza MD Responsible Provider: Elvi Tucker MD    Anesthesia Type: general ASA Status: 2          Anesthesia Type: general    Leslie Phase I:      Leslie Phase II:      Last vitals: Reviewed and per EMR flowsheets.        Anesthesia Post Evaluation    Patient location during evaluation: bedside  Patient participation: complete - patient participated  Level of consciousness: awake and awake and alert  Pain score: 0  Airway patency: patent  Nausea & Vomiting: no nausea and no vomiting  Complications: no  Cardiovascular status: blood pressure returned to baseline and hemodynamically stable  Respiratory status: acceptable and face mask  Hydration status: euvolemic

## 2022-01-14 NOTE — BRIEF OP NOTE
Brief Postoperative Note      Patient: Jaye De La Torre  YOB: 1980  MRN: [id number]    Date of Procedure: 1/14/2022    Pre-Op Diagnosis: ABSCESS OF NECK    Post-Op Diagnosis: Same       Procedure(s):  INCISION AND DRAINAGE NECK    Surgeon(s):  Fran Espinoza MD    Assistant:  First Assistant: Laure Trevino    Anesthesia: General    Estimated Blood Loss (mL): Minimal    Complications: None    Specimens:   ID Type Source Tests Collected by Time Destination   1 : neck abscess Swab Neck CULTURE, SURGICAL Fran Espinoza MD 1/14/2022 0815        Implants:  * No implants in log *      Drains: 1/2\" iodoform packing.     Findings: abscess neck 3 cm cavity    Electronically signed by Fran Espinoza MD on 1/14/2022 at 8:31 AM English

## 2022-01-14 NOTE — PROGRESS NOTES
Infectious Diseases Inpatient Progress Note          HISTORY OF PRESENT ILLNESS:  Follow up neck cellulitis with abscess status post an incision and drainage on IV Zosyn, well tolerated. Alcohol withdrawal symptoms are improving with decreased tremulousness and tachycardia. Just came back from the OR. Low-grade fevers. Positive for cytopenia  Wound culture with coag negative staph  Intra-Op cultures are pending  Current Medications:     chlordiazePOXIDE  25 mg Oral BID    lactobacillus acidophilus  1 tablet Oral BID    sodium chloride flush  5-40 mL IntraVENous 2 times per day    thiamine  100 mg Oral Daily    multivitamin  1 tablet Oral Daily       Allergies:  Patient has no known allergies. Review of Systems  14 system review is negative other than HPI    Physical Exam  Vitals:    01/14/22 0840 01/14/22 0845 01/14/22 0850 01/14/22 0855   BP: (!) 131/92 125/86 (!) 136/91 (!) 133/92   Pulse: 108 109 111 112   Resp: 30 23 27 23   Temp:    99.8 °F (37.7 °C)   TempSrc:       SpO2: 96% 94% 93% 93%   Weight:       Height:         General Appearance: alert and oriented to person, place and time, well-developed and well-nourished, in no acute distress  Skin: warm and dry, no rash. Head: normocephalic and atraumatic  Eyes: anicteric sclerae  ENT: oropharynx clear and moist with normal mucous membranes.  No oral thrush  Submandibular area with decreased swelling and induration with intact dressing  Lungs: normal respiratory effort  Abdomen: soft, no tenderness  No leg edema  No erythema, no tenderness      DATA:    Lab Results   Component Value Date    WBC 2.2 (L) 01/14/2022    HGB 12.0 (L) 01/14/2022    HCT 34.5 (L) 01/14/2022    .1 (H) 01/14/2022    PLT 76 (L) 01/14/2022     Lab Results   Component Value Date    CREATININE 0.38 (L) 01/14/2022    BUN 6 01/14/2022     (L) 01/14/2022    K 3.8 01/14/2022    CL 91 (L) 01/14/2022    CO2 25 01/14/2022       Hepatic Function Panel:  Lab Results Component Value Date    ALKPHOS 132 01/14/2022    ALT 27 01/14/2022     01/14/2022    PROT 7.1 01/14/2022    BILITOT 3.7 01/14/2022    LABALBU 3.7 01/14/2022       Microbiology:   Recent Labs     01/12/22  1950   BC No Growth to date. Any change in status will be called. Recent Labs     01/12/22 1951   BLOODCULT2 No Growth to date. Any change in status will be called. No results for input(s): LABURIN in the last 72 hours.   Recent Labs     01/12/22 2233   WNDABS Light growth  No further workup       IMPRESSION:    · Neck cellulitis with abscess  · Alcohol withdrawal    Patient Active Problem List   Diagnosis    Neck abscess    EtOH dependence     Alcohol withdrawal syndrome without complication ([facility] [address]       PLAN:  · Change Zosyn to vancomycin because of coag negative staph culture  · Check Intra-Op cultures and accordingly adjust antibiotics  · Follow-up CBC and clinically    Discussed with patient    Teri Dang MD

## 2022-01-14 NOTE — PROGRESS NOTES
Pharmacy Note  Vancomycin Consult    Angella Greene is a 39 y.o. male started on Vancomycin for SSTI; consult received from Dr. Dionne Hart MD to manage therapy. Also receiving the following antibiotics: none. Abscess [L02.91]  Neck abscess [L02.11]  Acute hyperactive alcohol withdrawal delirium ([facility] [address] [F10.231]  Allergies: Patient has no known allergies. Temp max: 100.2 F    Cultures  Recent Labs     01/14/22  0815 01/12/22  2233 01/12/22  2233 01/12/22  1951 01/12/22  1950 01/12/22  1538   BC  --   --   --   --  No Growth to date. Any change in status will be called. --    BLOODCULT2  --   --   --  No Growth to date. Any change in status will be called. --   --    LABGRAM Few WBC's, No organisms seen   < > Moderate WBC's  Few epithelial cells  Few Gram negative rods  Moderate Gram positive cocci  *  --   --   --    ORG  --   --  Staphylococcus coagulase-negative*  --   --   --    WNDABS  --   --  Light growth  No further workup    --   --   --    LABANAE  --   --  Culture in progress  --   --   --    COVID19  --   --   --   --   --  Not Detected    < > = values in this interval not displayed. Height: 6' (182.9 cm), Weight: 185 lb (83.9 kg), Body mass index is 25.09 kg/m². MRSA Nasal swab:N/a, does not meet criteria    Recent Labs     01/14/22  1035   CREATININE 0.38*   Estimated Creatinine Clearance: 281 mL/min (A) (based on SCr of 0.38 mg/dL (L)). .      Assessment/Plan:  Will initiate Vancomycin with a one time loading dose of 1500 mg x1, followed by 1250 mg IV every 12 hours. Data input into [employer] platform predicted therapeutic  trough 13.4. Random level ordered in 24-48 hours. Timing of future trough levels may be adjusted based on culture results, renal function, and clinical response. Thank you for the consult. Will continue to follow.   Morgan Briscoe, [facility] PharmD

## 2022-01-15 LAB
ALBUMIN SERPL-MCNC: 3.5 G/DL (ref 3.5–4.6)
ALP BLD-CCNC: 126 U/L (ref 35–104)
ALT SERPL-CCNC: 33 U/L (ref 0–41)
ANION GAP SERPL CALCULATED.3IONS-SCNC: 14 MEQ/L (ref 9–15)
AST SERPL-CCNC: 104 U/L (ref 0–40)
ATYPICAL LYMPHOCYTE RELATIVE PERCENT: 1 %
BASOPHILS ABSOLUTE: 0 K/UL (ref 0–0.2)
BASOPHILS RELATIVE PERCENT: 0.1 %
BILIRUB SERPL-MCNC: 2 MG/DL (ref 0.2–0.7)
BUN BLDV-MCNC: 8 MG/DL (ref 6–20)
CALCIUM SERPL-MCNC: 8 MG/DL (ref 8.5–9.9)
CHLORIDE BLD-SCNC: 95 MEQ/L (ref 95–107)
CO2: 21 MEQ/L (ref 20–31)
CREAT SERPL-MCNC: 0.37 MG/DL (ref 0.7–1.2)
EOSINOPHILS ABSOLUTE: 0 K/UL (ref 0–0.7)
EOSINOPHILS RELATIVE PERCENT: 0 %
GFR AFRICAN AMERICAN: >60
GFR NON-AFRICAN AMERICAN: >60
GLOBULIN: 2.8 G/DL (ref 2.3–3.5)
GLUCOSE BLD-MCNC: 224 MG/DL (ref 70–99)
HBA1C MFR BLD: 5.8 % (ref 4.8–5.9)
HCT VFR BLD CALC: 32.2 % (ref 42–52)
HEMOGLOBIN: 11.2 G/DL (ref 14–18)
LYMPHOCYTES ABSOLUTE: 0.2 K/UL (ref 1–4.8)
LYMPHOCYTES RELATIVE PERCENT: 7 %
MAGNESIUM: 2.1 MG/DL (ref 1.7–2.4)
MCH RBC QN AUTO: 34.8 PG (ref 27–31.3)
MCHC RBC AUTO-ENTMCNC: 34.7 % (ref 33–37)
MCV RBC AUTO: 100.4 FL (ref 80–100)
MONOCYTES ABSOLUTE: 0.1 K/UL (ref 0.2–0.8)
MONOCYTES RELATIVE PERCENT: 5.6 %
NEUTROPHILS ABSOLUTE: 1.9 K/UL (ref 1.4–6.5)
NEUTROPHILS RELATIVE PERCENT: 87 %
PDW BLD-RTO: 12.9 % (ref 11.5–14.5)
PLATELET # BLD: 85 K/UL (ref 130–400)
PLATELET SLIDE REVIEW: ABNORMAL
POTASSIUM SERPL-SCNC: 3.3 MEQ/L (ref 3.4–4.9)
RBC # BLD: 3.2 M/UL (ref 4.7–6.1)
SODIUM BLD-SCNC: 130 MEQ/L (ref 135–144)
TOTAL PROTEIN: 6.3 G/DL (ref 6.3–8)
WBC # BLD: 2.2 K/UL (ref 4.8–10.8)

## 2022-01-15 PROCEDURE — 2060000000 HC ICU INTERMEDIATE R&B

## 2022-01-15 PROCEDURE — 6370000000 HC RX 637 (ALT 250 FOR IP): Performed by: INTERNAL MEDICINE

## 2022-01-15 PROCEDURE — 36415 COLL VENOUS BLD VENIPUNCTURE: CPT

## 2022-01-15 PROCEDURE — 2580000003 HC RX 258: Performed by: INTERNAL MEDICINE

## 2022-01-15 PROCEDURE — 83036 HEMOGLOBIN GLYCOSYLATED A1C: CPT

## 2022-01-15 PROCEDURE — 6360000002 HC RX W HCPCS: Performed by: NURSE PRACTITIONER

## 2022-01-15 PROCEDURE — 85025 COMPLETE CBC W/AUTO DIFF WBC: CPT

## 2022-01-15 PROCEDURE — 80053 COMPREHEN METABOLIC PANEL: CPT

## 2022-01-15 PROCEDURE — 6360000002 HC RX W HCPCS: Performed by: INTERNAL MEDICINE

## 2022-01-15 PROCEDURE — 2580000003 HC RX 258: Performed by: NURSE PRACTITIONER

## 2022-01-15 PROCEDURE — 2700000000 HC OXYGEN THERAPY PER DAY

## 2022-01-15 PROCEDURE — 6370000000 HC RX 637 (ALT 250 FOR IP): Performed by: NURSE PRACTITIONER

## 2022-01-15 PROCEDURE — 83735 ASSAY OF MAGNESIUM: CPT

## 2022-01-15 RX ORDER — POTASSIUM CHLORIDE 20 MEQ/1
40 TABLET, EXTENDED RELEASE ORAL ONCE
Status: COMPLETED | OUTPATIENT
Start: 2022-01-15 | End: 2022-01-15

## 2022-01-15 RX ADMIN — LORAZEPAM 1 MG: 1 TABLET ORAL at 12:27

## 2022-01-15 RX ADMIN — LORAZEPAM 1 MG: 1 TABLET ORAL at 18:33

## 2022-01-15 RX ADMIN — THERA TABS 1 TABLET: TAB at 08:04

## 2022-01-15 RX ADMIN — CHLORDIAZEPOXIDE HYDROCHLORIDE 25 MG: 25 CAPSULE ORAL at 08:04

## 2022-01-15 RX ADMIN — ACETAMINOPHEN 650 MG: 325 TABLET ORAL at 08:09

## 2022-01-15 RX ADMIN — LORAZEPAM 1 MG: 2 INJECTION INTRAMUSCULAR; INTRAVENOUS at 03:11

## 2022-01-15 RX ADMIN — Medication 10 ML: at 08:03

## 2022-01-15 RX ADMIN — VANCOMYCIN HYDROCHLORIDE 1250 MG: 5 INJECTION, POWDER, LYOPHILIZED, FOR SOLUTION INTRAVENOUS at 16:15

## 2022-01-15 RX ADMIN — Medication 100 MG: at 08:04

## 2022-01-15 RX ADMIN — LACTOBACILLUS TAB 1 TABLET: TAB at 08:04

## 2022-01-15 RX ADMIN — LORAZEPAM 1 MG: 1 TABLET ORAL at 15:11

## 2022-01-15 RX ADMIN — LORAZEPAM 1 MG: 1 TABLET ORAL at 08:09

## 2022-01-15 RX ADMIN — LORAZEPAM 1 MG: 1 TABLET ORAL at 23:02

## 2022-01-15 RX ADMIN — LACTOBACILLUS TAB 1 TABLET: TAB at 23:01

## 2022-01-15 RX ADMIN — VANCOMYCIN HYDROCHLORIDE 1250 MG: 5 INJECTION, POWDER, LYOPHILIZED, FOR SOLUTION INTRAVENOUS at 03:11

## 2022-01-15 RX ADMIN — LORAZEPAM 1 MG: 1 TABLET ORAL at 17:12

## 2022-01-15 RX ADMIN — CHLORDIAZEPOXIDE HYDROCHLORIDE 25 MG: 25 CAPSULE ORAL at 23:02

## 2022-01-15 RX ADMIN — POTASSIUM CHLORIDE 40 MEQ: 1500 TABLET, EXTENDED RELEASE ORAL at 12:27

## 2022-01-15 ASSESSMENT — PAIN SCALES - GENERAL
PAINLEVEL_OUTOF10: 3
PAINLEVEL_OUTOF10: 3

## 2022-01-15 ASSESSMENT — ENCOUNTER SYMPTOMS
DIARRHEA: 0
VOMITING: 0
COUGH: 0
NAUSEA: 0
SHORTNESS OF BREATH: 0

## 2022-01-15 NOTE — PROGRESS NOTES
Pt shift assessment completed. Pt is alert & oriented x4. Vitals stable. On CIWA but has not required any ativan as of my shift so far. Pt denies pain. He has no requests at this time. Fall precautions in place. Will continue to monitor.      Electronically signed by Damien Seymour RN on 1/15/2022 at 2:18 AM

## 2022-01-15 NOTE — PROGRESS NOTES
Progress Note  Date:1/15/2022       Room:[place] [id number]  Patient Jake Schultz     Date of Birth:[birth year]     Age:41 y.o. Subjective    Subjective:  Symptoms:  He reports malaise and weakness. No shortness of breath, cough, chest pain, headache, chest pressure, anorexia, diarrhea or anxiety. Diet:  No nausea or vomiting. Review of Systems   Respiratory: Negative for cough and shortness of breath. Cardiovascular: Negative for chest pain. Gastrointestinal: Negative for anorexia, diarrhea, nausea and vomiting. Neurological: Positive for weakness. Objective         Vitals Last 24 Hours:  TEMPERATURE:  Temp  Av.3 °F (36.3 °C)  Min: 97.3 °F (36.3 °C)  Max: 97.3 °F (36.3 °C)  RESPIRATIONS RANGE: Resp  Av  Min: 18  Max: 18  PULSE OXIMETRY RANGE: SpO2  Av %  Min: 98 %  Max: 98 %  PULSE RANGE: Pulse  Av  Min: 75  Max: 90  BLOOD PRESSURE RANGE: Systolic (93EOC), LWI:748 , Min:105 , HKW:677   ; Diastolic (50CDS), DLX:92, Min:72, Max:87    I/O (24Hr): Intake/Output Summary (Last 24 hours) at 1/15/2022 1103  Last data filed at 1/15/2022 0401  Gross per 24 hour   Intake 1578 ml   Output 1125 ml   Net 453 ml     Objective:  General Appearance:  Comfortable, well-appearing and in no acute distress. Vital signs: (most recent): Blood pressure 118/86, pulse 90, temperature 97.3 °F (36.3 °C), temperature source Oral, resp. rate 18, height 6' (1.829 m), weight 185 lb (83.9 kg), SpO2 98 %. Lungs:  Normal effort and normal respiratory rate. Heart: Normal rate. S1 normal and S2 normal.    Abdomen: Abdomen is soft. Bowel sounds are normal.     Pulses: Distal pulses are intact. Neurological: Patient is alert and oriented to person, place and time. Pupils:  Pupils are equal, round, and reactive to light. Skin:  Warm and dry.       Labs/Imaging/Diagnostics    Labs:  CBC:  Recent Labs     22  1430 22  1035 01/15/22  0526   WBC 5.7 2.2* 2.2*   RBC 4.05* 3.45* 3.20*   HGB 13.8* 12.0* 11.2*   HCT 40.3* 34.5* 32.2*   MCV 99.5 100.1* 100.4*   RDW 12.9 13.0 12.9   PLT 64* 76* 85*     CHEMISTRIES:  Recent Labs     01/12/22  1430 01/12/22  1951 01/14/22  1035 01/15/22  0711   *  --  127* 130*   K 3.6  --  3.8 3.3*   CL 86*  --  91* 95   CO2 26  --  25 21   BUN 5*  --  6 8   CREATININE 0.41*  --  0.38* 0.37*   GLUCOSE 204*  --  186* 224*   MG  --  1.6* 2.0 2.1     PT/INR:  Recent Labs     01/13/22  1628   PROTIME 14.8   INR 1.2     APTT:No results for input(s): APTT in the last 72 hours. LIVER PROFILE:  Recent Labs     01/12/22  1430 01/14/22  1035 01/15/22  0711   * 113* 104*   ALT 29 27 33   BILITOT 3.2* 3.7* 2.0*   ALKPHOS 157* 132* 126*       Imaging Last 24 Hours:  CT SOFT TISSUE NECK W CONTRAST    Result Date: 1/13/2022  EXAMINATION: CT SOFT TISSUE NECK W CONTRAST DATE AND TIME:1/12/2022 8:32 PM CLINICAL HISTORY: ACUTE NECK PAIN. SWELLING, DRAINAGE, PAIN COMPARISON: NONE TECHNIQUE: Sequential axial CT images were obtained from the skull base to the thoracic inlet. Following the administration of 100 cc of nonionic IV contrast.  All CT scans at this facility use dose modulation, iterative reconstruction, and/or weight based dosing when appropriate to reduce radiation dose to as low as reasonably achievable. FINDINGS: 4 x 3.8 x 1.5 cm enhancing fluid collection in the right submandibular space with adjacent thickening and enhancement of the right platysma muscle and adjacent to 3 cm area of edema/fluid in the overlying right subcutaneous tissues. Findings consistent with right submandibular space abscess. Diffuse subcutaneous edema throughout the neck area compatible with cellulitis. Dental caries in the area of the right lower second molar. Otherwise the soft tissues of the nasopharynx and oropharynx are within normal limits. Remaining deep spaces included the parotid are unremarkable. No salivary gland stone. No cervical adenopathy.  Vascular structures are opacified and appear normal.. No airway compromise. Visualized sinuses are clear. RIGHT SUBMANDIBULAR ABSCESS WITH ASSOCIATED CELLULITIS OF THE NECK. Assessment//Plan           Hospital Problems           Last Modified POA    * (Principal) Neck abscess 1/13/2022 Yes    EtOH dependence  1/12/2022 Yes    Alcohol withdrawal syndrome without complication ([facility] [address] 7/49/4349 Yes      neck abscess  etoh abuse   Assessment & Plan    1/13: Continue IV clinda, add Librium. Continue CIWA protocol. ID evaluation. Follow-up cultures. Appreciated ENT input, change pt to inpt as per PA recommendation. 1/14: Patient went for I and D for the abscess, change IVF to NS for hyponatremia, taper down librium as tolerated, c/w CIWA protocol. 1/15: Continue with NS for hyponatremia, replace potassium. Continue CIWA protocol patient was still withdrawing from alcohol. Follow-up cultures, continue IV antibiotics. Follow-up ID and ENT.   Electronically signed by Renetta Romero MD on 1/13/22 at 11:52 AM EST

## 2022-01-15 NOTE — PROGRESS NOTES
Dressing to the neck changed. 1/2\" iodoform packing noted, 6\" of the packing removed per Dr. Hali Centeno. Cleansed with normal saline. 4x4 and transparent dressing applied with ABD pad to secure. Surgical incision measuring 2 x 1 cm. Pt tolerated well.

## 2022-01-15 NOTE — PROGRESS NOTES
Pharmacy Vancomycin Consult     Vancomycin Day: 2  Current Dosin mg q12h    Temp 24hr max:  97.3 F    Recent Labs     22  1035 01/15/22  0526 01/15/22  0711   BUN 6  --  8   CREATININE 0.38*  --  0.37*   WBC 2.2* 2.2*  --        Intake/Output Summary (Last 24 hours) at 1/15/2022 1439  Last data filed at 1/15/2022 0401  Gross per 24 hour   Intake 1578 ml   Output 1125 ml   Net 453 ml     Cultures  Recent Labs     22  0815 22  2233 22  2233 22  1951 22  1950 22  1538   BC  --   --   --   --  No Growth to date. Any change in status will be called. --    BLOODCULT2  --   --   --  No Growth to date. Any change in status will be called. --   --    LABGRAM Few WBC's, No organisms seen   < > Moderate WBC's  Few epithelial cells  Few Gram negative rods  Moderate Gram positive cocci  *  --   --   --    ORG  --   --  Staphylococcus coagulase-negative*  Staph aureus MSSA*  --   --   --    CXSURG No growth 24 hours  --   --   --   --   --    WNDABS  --   --  Light growth  No further workup    Rare growth  Sensitivity to follow  PBP2= Negative    --   --   --    LABANAE Culture in progress   < > Culture in progress  --   --   --    COVID19  --   --   --   --   --  Not Detected    < > = values in this interval not displayed. Height: 6' (182.9 cm), Weight: 185 lb (83.9 kg), Body mass index is 25.09 kg/m². Estimated Creatinine Clearance: 288 mL/min (A) (based on SCr of 0.37 mg/dL (L)). .    Trough:  No results for input(s): Nolon Stake in the last 72 hours. Assessment/Plan:  New date input into [email or web address]. Continuing current dosing, Bayesian model predicts:    mg/L*hr   Trough concentration at steady state 13.3 mg/L   Probability AUC is >400 mg/L*hr 65%   Probability trough >20 mg/L 22%   Probability of nephrotoxicity 8%     Therefore, will continue current dosing of 1250 mg q12h.    Anticipate next random level to continue as scheduled, tomorrow with morning labs. Timing of future trough levels may be adjusted based on culture results, renal function, and clinical response.     Thank you,    Wanda Del Real, PharmD  PGY-1 Pharmacy Resident  1/15/2022 2:41 PM

## 2022-01-16 LAB
ALBUMIN SERPL-MCNC: 3.3 G/DL (ref 3.5–4.6)
ALP BLD-CCNC: 119 U/L (ref 35–104)
ALT SERPL-CCNC: 69 U/L (ref 0–41)
ANAEROBIC CULTURE: ABNORMAL
ANAEROBIC CULTURE: ABNORMAL
ANION GAP SERPL CALCULATED.3IONS-SCNC: 12 MEQ/L (ref 9–15)
AST SERPL-CCNC: 180 U/L (ref 0–40)
BASOPHILS ABSOLUTE: 0 K/UL (ref 0–0.2)
BASOPHILS RELATIVE PERCENT: 0.7 %
BILIRUB SERPL-MCNC: 1.7 MG/DL (ref 0.2–0.7)
BUN BLDV-MCNC: 6 MG/DL (ref 6–20)
CALCIUM SERPL-MCNC: 8.2 MG/DL (ref 8.5–9.9)
CHLORIDE BLD-SCNC: 103 MEQ/L (ref 95–107)
CO2: 24 MEQ/L (ref 20–31)
CREAT SERPL-MCNC: 0.43 MG/DL (ref 0.7–1.2)
EOSINOPHILS ABSOLUTE: 0 K/UL (ref 0–0.7)
EOSINOPHILS RELATIVE PERCENT: 1.4 %
GFR AFRICAN AMERICAN: >60
GFR NON-AFRICAN AMERICAN: >60
GLOBULIN: 2.8 G/DL (ref 2.3–3.5)
GLUCOSE BLD-MCNC: 134 MG/DL (ref 70–99)
GRAM STAIN RESULT: ABNORMAL
HCT VFR BLD CALC: 31.3 % (ref 42–52)
HEMOGLOBIN: 11 G/DL (ref 14–18)
LYMPHOCYTES ABSOLUTE: 0.1 K/UL (ref 1–4.8)
LYMPHOCYTES RELATIVE PERCENT: 9.7 %
MAGNESIUM: 1.8 MG/DL (ref 1.7–2.4)
MCH RBC QN AUTO: 35.2 PG (ref 27–31.3)
MCHC RBC AUTO-ENTMCNC: 35 % (ref 33–37)
MCV RBC AUTO: 100.4 FL (ref 80–100)
MONOCYTES ABSOLUTE: 0.2 K/UL (ref 0.2–0.8)
MONOCYTES RELATIVE PERCENT: 13.1 %
NEUTROPHILS ABSOLUTE: 1.2 K/UL (ref 1.4–6.5)
NEUTROPHILS RELATIVE PERCENT: 75.1 %
ORGANISM: ABNORMAL
PDW BLD-RTO: 13.5 % (ref 11.5–14.5)
PLATELET # BLD: 114 K/UL (ref 130–400)
POTASSIUM SERPL-SCNC: 3.4 MEQ/L (ref 3.4–4.9)
RBC # BLD: 3.12 M/UL (ref 4.7–6.1)
SODIUM BLD-SCNC: 139 MEQ/L (ref 135–144)
TOTAL PROTEIN: 6.1 G/DL (ref 6.3–8)
VANCOMYCIN RANDOM: 9.5 UG/ML (ref 10–40)
WBC # BLD: 1.5 K/UL (ref 4.8–10.8)
WOUND/ABSCESS: ABNORMAL
WOUND/ABSCESS: ABNORMAL

## 2022-01-16 PROCEDURE — 6370000000 HC RX 637 (ALT 250 FOR IP): Performed by: INTERNAL MEDICINE

## 2022-01-16 PROCEDURE — 80074 ACUTE HEPATITIS PANEL: CPT

## 2022-01-16 PROCEDURE — 6360000002 HC RX W HCPCS: Performed by: INTERNAL MEDICINE

## 2022-01-16 PROCEDURE — 2060000000 HC ICU INTERMEDIATE R&B

## 2022-01-16 PROCEDURE — 99232 SBSQ HOSP IP/OBS MODERATE 35: CPT | Performed by: INTERNAL MEDICINE

## 2022-01-16 PROCEDURE — 2580000003 HC RX 258: Performed by: INTERNAL MEDICINE

## 2022-01-16 PROCEDURE — 6370000000 HC RX 637 (ALT 250 FOR IP): Performed by: NURSE PRACTITIONER

## 2022-01-16 PROCEDURE — 83735 ASSAY OF MAGNESIUM: CPT

## 2022-01-16 PROCEDURE — 80202 ASSAY OF VANCOMYCIN: CPT

## 2022-01-16 PROCEDURE — 85025 COMPLETE CBC W/AUTO DIFF WBC: CPT

## 2022-01-16 PROCEDURE — 36415 COLL VENOUS BLD VENIPUNCTURE: CPT

## 2022-01-16 PROCEDURE — 2580000003 HC RX 258: Performed by: NURSE PRACTITIONER

## 2022-01-16 PROCEDURE — 80053 COMPREHEN METABOLIC PANEL: CPT

## 2022-01-16 RX ORDER — CEPHALEXIN 500 MG/1
500 CAPSULE ORAL 3 TIMES DAILY
Qty: 30 CAPSULE | Refills: 0 | Status: SHIPPED | OUTPATIENT
Start: 2022-01-16 | End: 2022-01-17 | Stop reason: HOSPADM

## 2022-01-16 RX ORDER — CEPHALEXIN 500 MG/1
500 CAPSULE ORAL EVERY 8 HOURS SCHEDULED
Status: DISCONTINUED | OUTPATIENT
Start: 2022-01-16 | End: 2022-01-17

## 2022-01-16 RX ORDER — MORPHINE SULFATE 2 MG/ML
2 INJECTION, SOLUTION INTRAMUSCULAR; INTRAVENOUS EVERY 4 HOURS PRN
Status: DISCONTINUED | OUTPATIENT
Start: 2022-01-16 | End: 2022-01-17 | Stop reason: HOSPADM

## 2022-01-16 RX ADMIN — LORAZEPAM 1 MG: 1 TABLET ORAL at 01:29

## 2022-01-16 RX ADMIN — CEPHALEXIN 500 MG: 500 CAPSULE ORAL at 20:23

## 2022-01-16 RX ADMIN — Medication 5 ML: at 22:54

## 2022-01-16 RX ADMIN — Medication 10 ML: at 08:42

## 2022-01-16 RX ADMIN — LORAZEPAM 1 MG: 1 TABLET ORAL at 08:32

## 2022-01-16 RX ADMIN — CHLORDIAZEPOXIDE HYDROCHLORIDE 25 MG: 25 CAPSULE ORAL at 08:29

## 2022-01-16 RX ADMIN — Medication 100 MG: at 08:29

## 2022-01-16 RX ADMIN — LACTOBACILLUS TAB 1 TABLET: TAB at 20:23

## 2022-01-16 RX ADMIN — MORPHINE SULFATE 2 MG: 2 INJECTION, SOLUTION INTRAMUSCULAR; INTRAVENOUS at 12:48

## 2022-01-16 RX ADMIN — CEPHALEXIN 500 MG: 500 CAPSULE ORAL at 16:34

## 2022-01-16 RX ADMIN — LORAZEPAM 1 MG: 1 TABLET ORAL at 20:22

## 2022-01-16 RX ADMIN — VANCOMYCIN HYDROCHLORIDE 1250 MG: 5 INJECTION, POWDER, LYOPHILIZED, FOR SOLUTION INTRAVENOUS at 05:03

## 2022-01-16 RX ADMIN — THERA TABS 1 TABLET: TAB at 08:29

## 2022-01-16 RX ADMIN — LACTOBACILLUS TAB 1 TABLET: TAB at 08:28

## 2022-01-16 RX ADMIN — LORAZEPAM 1 MG: 1 TABLET ORAL at 16:37

## 2022-01-16 ASSESSMENT — ENCOUNTER SYMPTOMS
DIARRHEA: 0
VOMITING: 0
NAUSEA: 0
SHORTNESS OF BREATH: 0
COUGH: 0

## 2022-01-16 ASSESSMENT — PAIN SCALES - GENERAL
PAINLEVEL_OUTOF10: 5
PAINLEVEL_OUTOF10: 3

## 2022-01-16 NOTE — PROGRESS NOTES
Progress Note  Date:2022       Room:[place] [id number]  Patient Jr Mitchell     Date of Birth:[birth year]     Age:42 y.o. Subjective    Subjective:  Symptoms:  He reports malaise and weakness. No shortness of breath, cough, chest pain, headache, chest pressure, anorexia, diarrhea or anxiety. Diet:  No nausea or vomiting. Review of Systems   Respiratory: Negative for cough and shortness of breath. Cardiovascular: Negative for chest pain. Gastrointestinal: Negative for anorexia, diarrhea, nausea and vomiting. Neurological: Positive for weakness. Objective         Vitals Last 24 Hours:  TEMPERATURE:  Temp  Av.1 °F (36.7 °C)  Min: 98.1 °F (36.7 °C)  Max: 98.1 °F (36.7 °C)  RESPIRATIONS RANGE: Resp  Av  Min: 18  Max: 18  PULSE OXIMETRY RANGE: SpO2  Av %  Min: 98 %  Max: 98 %  PULSE RANGE: Pulse  Av.2  Min: 87  Max: 118  BLOOD PRESSURE RANGE: Systolic (60AQQ), OWQ:753 , Min:102 , MPI:159   ; Diastolic (53KSO), UQK:66, Min:73, Max:97    I/O (24Hr): Intake/Output Summary (Last 24 hours) at 2022 0945  Last data filed at 2022 0630  Gross per 24 hour   Intake 444 ml   Output --   Net 444 ml     Objective:  General Appearance:  Comfortable, well-appearing and in no acute distress. Vital signs: (most recent): Blood pressure 102/73, pulse 87, temperature 98.1 °F (36.7 °C), temperature source Oral, resp. rate 18, height 6' (1.829 m), weight 185 lb (83.9 kg), SpO2 98 %. Lungs:  Normal effort and normal respiratory rate. Heart: Normal rate. S1 normal and S2 normal.    Abdomen: Abdomen is soft. Bowel sounds are normal.     Pulses: Distal pulses are intact. Neurological: Patient is alert and oriented to person, place and time. Pupils:  Pupils are equal, round, and reactive to light. Skin:  Warm and dry.       Labs/Imaging/Diagnostics    Labs:  CBC:  Recent Labs     22  1035 01/15/22  0526   WBC 2.2* 2.2*   RBC 3.45* 3.20*   HGB 12.0* 11.2*   HCT 34.5* 32.2*   .1* 100.4*   RDW 13.0 12.9   PLT 76* 85*     CHEMISTRIES:  Recent Labs     01/14/22  1035 01/15/22  0711   * 130*   K 3.8 3.3*   CL 91* 95   CO2 25 21   BUN 6 8   CREATININE 0.38* 0.37*   GLUCOSE 186* 224*   MG 2.0 2.1     PT/INR:  Recent Labs     01/13/22  1628   PROTIME 14.8   INR 1.2     APTT:No results for input(s): APTT in the last 72 hours. LIVER PROFILE:  Recent Labs     01/14/22  1035 01/15/22  0711   * 104*   ALT 27 33   BILITOT 3.7* 2.0*   ALKPHOS 132* 126*       Imaging Last 24 Hours:  CT SOFT TISSUE NECK W CONTRAST    Result Date: 1/13/2022  EXAMINATION: CT SOFT TISSUE NECK W CONTRAST DATE AND TIME:1/12/2022 8:32 PM CLINICAL HISTORY: ACUTE NECK PAIN. SWELLING, DRAINAGE, PAIN COMPARISON: NONE TECHNIQUE: Sequential axial CT images were obtained from the skull base to the thoracic inlet. Following the administration of 100 cc of nonionic IV contrast.  All CT scans at this facility use dose modulation, iterative reconstruction, and/or weight based dosing when appropriate to reduce radiation dose to as low as reasonably achievable. FINDINGS: 4 x 3.8 x 1.5 cm enhancing fluid collection in the right submandibular space with adjacent thickening and enhancement of the right platysma muscle and adjacent to 3 cm area of edema/fluid in the overlying right subcutaneous tissues. Findings consistent with right submandibular space abscess. Diffuse subcutaneous edema throughout the neck area compatible with cellulitis. Dental caries in the area of the right lower second molar. Otherwise the soft tissues of the nasopharynx and oropharynx are within normal limits. Remaining deep spaces included the parotid are unremarkable. No salivary gland stone. No cervical adenopathy. Vascular structures are opacified and appear normal.. No airway compromise. Visualized sinuses are clear. RIGHT SUBMANDIBULAR ABSCESS WITH ASSOCIATED CELLULITIS OF THE NECK.      Assessment//Plan           Hospital Problems           Last Modified POA    * (Principal) Neck abscess 1/13/2022 Yes    EtOH dependence  1/12/2022 Yes    Alcohol withdrawal syndrome without complication ([facility] [address] 4/67/5610 Yes      neck abscess  etoh abuse   Assessment & Plan    1/13: Continue IV clinda, add Librium. Continue CIWA protocol. ID evaluation. Follow-up cultures. Appreciated ENT input, change pt to inpt as per PA recommendation. 1/14: Patient went for I and D for the abscess, change IVF to NS for hyponatremia, taper down librium as tolerated, c/w CIWA protocol. 1/15: Continue with NS for hyponatremia, replace potassium. Continue CIWA protocol patient was still withdrawing from alcohol. Follow-up cultures, continue IV antibiotics. Follow-up ID and ENT.   1/16: pt is on CIWA protocol, FU labs no overnight events, anticipate discharge tomorrow on abx, Abx as per ID, FU cultures ,  Electronically signed by Jocelyn Thomas MD on 1/13/22 at 11:52 AM EST

## 2022-01-16 NOTE — PROGRESS NOTES
Infectious Diseases Inpatient Progress Note          HISTORY OF PRESENT ILLNESS:  Follow up neck cellulitis with abscess status post an incision and drainage on IV vancomycin, well tolerated. Alcohol withdrawal symptoms are improving with decreased tremulousness and tachycardia. Does not feel ready to go home  Has packing in Chin wound  Intra-Op culture with methicillin sensitive staph aureus and coag negative staph  Current Medications:     vancomycin (VANCOCIN) intermittent dosing (placeholder)   Other RX Placeholder    vancomycin  1,250 mg IntraVENous Q12H    lactobacillus acidophilus  1 tablet Oral BID    sodium chloride flush  5-40 mL IntraVENous 2 times per day    thiamine  100 mg Oral Daily    multivitamin  1 tablet Oral Daily       Allergies:  Patient has no known allergies. Review of Systems  14 system review is negative other than HPI    Physical Exam  Vitals:    01/15/22 2117 01/15/22 2306 01/16/22 0125 01/16/22 0749   BP: 118/86 (!) 122/97 102/73 (!) 127/95   Pulse: 118 98 87 81   Resp: 18 18  16   Temp: 98.1 °F (36.7 °C)   98.4 °F (36.9 °C)   TempSrc: Oral   Oral   SpO2: 98%   100%   Weight:       Height:         General Appearance: alert and oriented to person, place and time, well-developed and well-nourished, in no acute distress  Skin: warm and dry, no rash. Head: normocephalic and atraumatic  Eyes: anicteric sclerae  ENT: oropharynx clear and moist with normal mucous membranes. No oral thrush  Submandibular area with decreased swelling and induration with intact dressing.   No erythema or tenderness  Lungs: normal respiratory effort  Abdomen: soft, no tenderness  No leg edema  No erythema, no tenderness      DATA:    Lab Results   Component Value Date    WBC 1.5 (L) 01/16/2022    HGB 11.0 (L) 01/16/2022    HCT 31.3 (L) 01/16/2022    .4 (H) 01/16/2022     (L) 01/16/2022     Lab Results   Component Value Date    CREATININE 0.37 (L) 01/15/2022    BUN 8 01/15/2022     (L) 01/15/2022    K 3.3 (L) 01/15/2022    CL 95 01/15/2022    CO2 21 01/15/2022       Hepatic Function Panel:  Lab Results   Component Value Date    ALKPHOS 126 01/15/2022    ALT 33 01/15/2022     01/15/2022    PROT 6.3 01/15/2022    BILITOT 2.0 01/15/2022    LABALBU 3.5 01/15/2022       Microbiology:   No results for input(s): BC in the last 72 hours. No results for input(s): Lannis Welsh in the last 72 hours. No results for input(s): LABURIN in the last 72 hours. No results for input(s): WNDABS in the last 72 hours.   IMPRESSION:    · Neck cellulitis with abscess  · Methicillin sensitive staph aureus infection  · Pancytopenia probably related to alcohol abuse  · Alcohol withdrawal    Patient Active Problem List   Diagnosis    Neck abscess    EtOH dependence     Alcohol withdrawal syndrome without complication ([facility] [address]       PLAN:  · DC IV vancomycin   · Keflex 500 mg p.o. 3 times daily for 10 days  · Follow-up as needed    Discussed with patient    Gilma Ny MD

## 2022-01-16 NOTE — CARE COORDINATION
PLAN IS FOR THE PATIENT TO D/C HOME TOMORROW. PER ID DC ON PO ATB. PLANS TO GO HOME W/ [facility]. NO OTHER NEED IDENTIFIED. CM TO FOLLOW FOR ANY NEW D/C NEEDS OR CHANGES TO THE D/C PLAN.

## 2022-01-17 VITALS
BODY MASS INDEX: 25.06 KG/M2 | SYSTOLIC BLOOD PRESSURE: 123 MMHG | RESPIRATION RATE: 16 BRPM | HEIGHT: 72 IN | HEART RATE: 86 BPM | OXYGEN SATURATION: 98 % | TEMPERATURE: 97.3 F | WEIGHT: 185 LBS | DIASTOLIC BLOOD PRESSURE: 82 MMHG

## 2022-01-17 LAB
ALBUMIN SERPL-MCNC: 3.4 G/DL (ref 3.5–4.6)
ALP BLD-CCNC: 138 U/L (ref 35–104)
ALT SERPL-CCNC: 94 U/L (ref 0–41)
ANAEROBIC CULTURE: NORMAL
ANION GAP SERPL CALCULATED.3IONS-SCNC: 14 MEQ/L (ref 9–15)
AST SERPL-CCNC: 171 U/L (ref 0–40)
BASOPHILS ABSOLUTE: 0 K/UL (ref 0–0.2)
BASOPHILS RELATIVE PERCENT: 0.9 %
BILIRUB SERPL-MCNC: 1.8 MG/DL (ref 0.2–0.7)
BUN BLDV-MCNC: 4 MG/DL (ref 6–20)
CALCIUM SERPL-MCNC: 8.5 MG/DL (ref 8.5–9.9)
CHLORIDE BLD-SCNC: 102 MEQ/L (ref 95–107)
CO2: 23 MEQ/L (ref 20–31)
CREAT SERPL-MCNC: 0.32 MG/DL (ref 0.7–1.2)
CULTURE SURGICAL: NORMAL
EOSINOPHILS ABSOLUTE: 0 K/UL (ref 0–0.7)
EOSINOPHILS RELATIVE PERCENT: 1.1 %
GFR AFRICAN AMERICAN: >60
GFR NON-AFRICAN AMERICAN: >60
GLOBULIN: 2.9 G/DL (ref 2.3–3.5)
GLUCOSE BLD-MCNC: 133 MG/DL (ref 70–99)
GRAM STAIN RESULT: NORMAL
HCT VFR BLD CALC: 34.5 % (ref 42–52)
HEMOGLOBIN: 11.8 G/DL (ref 14–18)
LYMPHOCYTES ABSOLUTE: 0.2 K/UL (ref 1–4.8)
LYMPHOCYTES RELATIVE PERCENT: 13.8 %
MAGNESIUM: 1.8 MG/DL (ref 1.7–2.4)
MCH RBC QN AUTO: 34.8 PG (ref 27–31.3)
MCHC RBC AUTO-ENTMCNC: 34.1 % (ref 33–37)
MCV RBC AUTO: 101.8 FL (ref 80–100)
MONOCYTES ABSOLUTE: 0.2 K/UL (ref 0.2–0.8)
MONOCYTES RELATIVE PERCENT: 12.9 %
NEUTROPHILS ABSOLUTE: 1.2 K/UL (ref 1.4–6.5)
NEUTROPHILS RELATIVE PERCENT: 71.3 %
PDW BLD-RTO: 13.4 % (ref 11.5–14.5)
PLATELET # BLD: 122 K/UL (ref 130–400)
POTASSIUM SERPL-SCNC: 3.4 MEQ/L (ref 3.4–4.9)
RBC # BLD: 3.39 M/UL (ref 4.7–6.1)
SODIUM BLD-SCNC: 139 MEQ/L (ref 135–144)
TOTAL PROTEIN: 6.3 G/DL (ref 6.3–8)
WBC # BLD: 1.7 K/UL (ref 4.8–10.8)

## 2022-01-17 PROCEDURE — 6370000000 HC RX 637 (ALT 250 FOR IP): Performed by: INTERNAL MEDICINE

## 2022-01-17 PROCEDURE — 36415 COLL VENOUS BLD VENIPUNCTURE: CPT

## 2022-01-17 PROCEDURE — 80053 COMPREHEN METABOLIC PANEL: CPT

## 2022-01-17 PROCEDURE — 85025 COMPLETE CBC W/AUTO DIFF WBC: CPT

## 2022-01-17 PROCEDURE — 99232 SBSQ HOSP IP/OBS MODERATE 35: CPT | Performed by: INTERNAL MEDICINE

## 2022-01-17 PROCEDURE — 83735 ASSAY OF MAGNESIUM: CPT

## 2022-01-17 PROCEDURE — 6370000000 HC RX 637 (ALT 250 FOR IP): Performed by: NURSE PRACTITIONER

## 2022-01-17 PROCEDURE — 2580000003 HC RX 258: Performed by: NURSE PRACTITIONER

## 2022-01-17 RX ORDER — AMOXICILLIN AND CLAVULANATE POTASSIUM 875; 125 MG/1; MG/1
1 TABLET, FILM COATED ORAL EVERY 12 HOURS SCHEDULED
Status: DISCONTINUED | OUTPATIENT
Start: 2022-01-17 | End: 2022-01-17 | Stop reason: HOSPADM

## 2022-01-17 RX ORDER — MULTIVITAMIN WITH IRON
1 TABLET ORAL DAILY
Qty: 30 TABLET | Refills: 0 | Status: SHIPPED | OUTPATIENT
Start: 2022-01-18 | End: 2022-02-17

## 2022-01-17 RX ORDER — AMOXICILLIN AND CLAVULANATE POTASSIUM 875; 125 MG/1; MG/1
1 TABLET, FILM COATED ORAL 2 TIMES DAILY
Qty: 28 TABLET | Refills: 0 | Status: SHIPPED | OUTPATIENT
Start: 2022-01-17 | End: 2022-01-31

## 2022-01-17 RX ADMIN — CEPHALEXIN 500 MG: 500 CAPSULE ORAL at 05:44

## 2022-01-17 RX ADMIN — LACTOBACILLUS TAB 1 TABLET: TAB at 07:13

## 2022-01-17 RX ADMIN — Medication 100 MG: at 07:14

## 2022-01-17 RX ADMIN — LORAZEPAM 1 MG: 1 TABLET ORAL at 07:13

## 2022-01-17 RX ADMIN — Medication 5 ML: at 07:16

## 2022-01-17 RX ADMIN — THERA TABS 1 TABLET: TAB at 07:14

## 2022-01-17 ASSESSMENT — ENCOUNTER SYMPTOMS
FACIAL SWELLING: 1
RESPIRATORY NEGATIVE: 1
GASTROINTESTINAL NEGATIVE: 1

## 2022-01-17 NOTE — PROGRESS NOTES
10\" of 1/2\" iodoform packing inserted into surgical incision under the right side of the chin. 2x2 placed over it with transparent dressing. Pt premedicated with morphine 30 minutes before procedure. Pt tolerated well.

## 2022-01-17 NOTE — PROGRESS NOTES
This nurse assumed care of patient at 1100. Patient pleasant and cooperative this am, denies pain or discomfort at this time. Alert and oriented x 4. Patient to discharge this shift, discharge instructions reviewed with patient. Patient informed of wound clinic being closed at this time but his face sheet was sent to them to schedule an appt for possibly tomorrow as requested by Dr. Miki Escalera who was in today, patient verbalizes understanding.    1300 Patient discharged with all belongings

## 2022-01-17 NOTE — DISCHARGE SUMMARY
Discharge Summary    Date: 1/17/2022  Patient Name: Eloisa Thomas YOB: 1980 Age: 43 y.o. Admit Date: 1/12/2022  Discharge Date: 1/17/2022  Discharge Condition: [place]    Admission Diagnosis  Abscess (L02.91); Neck abscess (L02.11); Acute hyperactive alcohol withdrawal delirium (HCC) (F10.231)     Discharge Diagnosis  Principal Problem: Neck abscessActive Problems: EtOH dependence Alcohol withdrawal syndrome without complication (HCC) Staph aureus infectionResolved Problems: * No resolved hospital problems. [facility]  Narrative of Hospital Course:  Patient comes in with neck abscess status post I&D was eval by ID and recommended to be discharged on p.o. antibiotics. Patient received clinda and switched to Zosyn while here. Patient was also on CIWA protocol. 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 Needs to FU with PCP in 1 week    Consultants:  [address] CONSULT TO OTOLARYNGOLOGYIP CONSULT TO INFECTIOUS DISEASESIP CONSULT TO OTOLARYNGOLOGYPHARMACY TO DOSE VANCOMYCINIP CONSULT TO ONCOLOGY    Surgeries/procedures Performed:       Treatments:    Antibiotics        Discharge Plan/Disposition:  Home    Hospital/Incidental Findings Requiring Follow Up:    Patient Instructions:    Diet: Regular Diet    Activity:Activity as Tolerated  For number of days (if applicable): Other Instructions:    Provider Follow-Up:   No follow-ups on file. Significant Diagnostic Studies:    Recent Labs:  Admission on 01/12/2022No results displayed because visit has over 200 results. ------------    Radiology last 7 days:  CT SOFT TISSUE NECK W CONTRASTResult Date: 1/13/2022RIGHT SUBMANDIBULAR ABSCESS WITH ASSOCIATED CELLULITIS OF THE NECK.       Pending Labs   Order Current Status  Magnesium Collected (01/12/22 1435)  HEPATITIS PANEL, ACUTE In process  Culture, Blood 1 Preliminary result  Culture, Blood 2 Preliminary result  Culture, Surgical Preliminary result      Discharge Medications    Current Discharge Medication ListSTART taking these medicationsMultiple Vitamin (MULTIVITAMIN) TABS tabletTake 1 tablet by mouth dailyQty: 30 tablet Refills: 0cephALEXin (KEFLEX) 500 MG capsuleTake 1 capsule by mouth 3 times daily for 10 daysQty: 30 capsule Refills: 0    Current Discharge Medication List    Current Discharge Medication List    Current Discharge Medication List    Time Spent on Discharge:E] minutes were spent in patient examination, evaluation, counseling as well as medication reconciliation, prescriptions for required medications, discharge plan, and follow up.     Electronically signed by Rupinder Sylvester MD on 1/17/22 at 11:11 AM EST   overtime on dc summary was 45 min

## 2022-01-17 NOTE — PROGRESS NOTES
Infectious Disease     Patient Name: Jyoti Cooper  Date: 1/17/2022  YOB: 1980  Medical Record Number: [id number]        Neck abscess status post incision and drainage      01/14/2022     PREOPERATIVE DIAGNOSIS:  Neck abscess, deep.     POSTOPERATIVE DIAGNOSIS:  Neck abscess, deep.     NAME OF OPERATION:  Incision and drainage, neck abscess with cultures  and packing. Cultures growing methicillin sensitive Staph aureus  Anaerobic gram-positive cocci B fragilis    1/12/22 2233    Gram Stain Result  Abnormal   Moderate WBC's   Few epithelial cells   Few Gram negative rods   Moderate Gram positive cocci     Organism Staphylococcus coagulase-negative Abnormal     WOUND/ABSCESS Light growth   No further workup    Organism Staph aureus MSSA Abnormal     WOUND/ABSCESS Rare growth   PBP2= Negative    Organism Anaerobic gram positive cocci Abnormal     Anaerobic Culture Light growth   Sensitivities not routinely done. Drugs of choice are:   Penicillin G, Metronidazole, Clindamycin or   Piperacillin/Tazobactam.    Organism Bacteroides fragilis group Abnormal     Anaerobic Culture Light growth   Beta Lactamase POSITIVE. Sensitivities not routinely done. Drugs of choice are: Metronidazole,   Cefoxitin, or Piperacillin/Tazobactam.    [facility]          Susceptibility     Staph aureus mssa     BACTERIAL SUSCEPTIBILITY PANEL BY JAVIER     ceFAZolin  Sensitive     cefTRIAXone  Sensitive     clindamycin 0.25 mcg/mL Sensitive     gentamicin <=0.5 mcg/mL Sensitive     oxacillin 0.5 mcg/mL Sensitive     trimethoprim-sulfamethoxazole <=10 mcg/mL Sensitive                   EXAMINATION: CT SOFT TISSUE NECK W CONTRAST        DATE AND TIME:1/12/2022 8:32 PM       CLINICAL HISTORY: ACUTE NECK PAIN.  SWELLING, DRAINAGE, PAIN        COMPARISON: NONE       TECHNIQUE: Sequential axial CT images were obtained from the skull base to the thoracic inlet.  Following the administration of 100 cc of nonionic IV contrast.  All CT scans at this facility use dose modulation, iterative reconstruction, and/or weight    based dosing when appropriate to reduce radiation dose to as low as reasonably achievable.       FINDINGS: 4 x 3.8 x 1.5 cm enhancing fluid collection in the right submandibular space with adjacent thickening and enhancement of the right platysma muscle and adjacent to 3 cm area of edema/fluid in the overlying right subcutaneous tissues. Findings    consistent with right submandibular space abscess. Diffuse subcutaneous edema throughout the neck area compatible with cellulitis.       Dental caries in the area of the right lower second molar.       Otherwise the soft tissues of the nasopharynx and oropharynx are within normal limits. Remaining deep spaces included the parotid are unremarkable. No salivary gland stone. No cervical adenopathy. Vascular structures are opacified and appear normal.. No    airway compromise. Visualized sinuses are clear.               Impression   RIGHT SUBMANDIBULAR ABSCESS WITH ASSOCIATED CELLULITIS OF THE NECK. Review of Systems   Constitutional: Negative. HENT: Positive for facial swelling. Neck pain and swelling no difficulty swallowing no fevers chills   Respiratory: Negative. Cardiovascular: Negative. Gastrointestinal: Negative.         Review of Systems: All 14 review of systems negative other than as stated above    Social History     Tobacco Use    Smoking status: Current Every Day Smoker     Packs/day: 0.25     Types: Cigarettes    Smokeless tobacco: Never Used   Substance Use Topics    Alcohol use: Yes     Comment: daily/ 1L vodka    Drug use: Not Currently         Past Medical History:   Diagnosis Date    Asthma     15 yrs old, no meds           Past Surgical History:   Procedure Laterality Date    NECK SURGERY N/A 1/14/2022    INCISION AND DRAINAGE NECK performed by Jacob Regalado MD [facility]         No current facility-administered medications on file prior to encounter. No current outpatient medications on file prior to encounter. No Known Allergies      No family history on file. Physical Exam:      Physical Exam  Constitutional:       Appearance: He is not ill-appearing. Neck:     Cardiovascular:      Heart sounds: Normal heart sounds. No murmur heard. Pulmonary:      Effort: Pulmonary effort is normal. No respiratory distress. Breath sounds: Normal breath sounds. No wheezing, rhonchi or rales. Abdominal:      General: Abdomen is flat. Bowel sounds are normal. There is no distension. Palpations: There is no mass. Tenderness: There is no abdominal tenderness. There is no guarding. Blood pressure 123/82, pulse 86, temperature 97.3 °F (36.3 °C), temperature source Oral, resp. rate 16, height 6' (1.829 m), weight 185 lb (83.9 kg), SpO2 98 %.       .   Lab Results   Component Value Date    WBC 1.7 (L) 01/17/2022    HGB 11.8 (L) 01/17/2022    HCT 34.5 (L) 01/17/2022    .8 (H) 01/17/2022     (L) 01/17/2022     Lab Results   Component Value Date     01/17/2022    K 3.4 01/17/2022     01/17/2022    CO2 23 01/17/2022    BUN 4 01/17/2022    CREATININE 0.32 01/17/2022    GLUCOSE 133 01/17/2022    CALCIUM 8.5 01/17/2022                ASSESSMENT:  Patient Active Problem List   Diagnosis    Neck abscess    EtOH dependence     Alcohol withdrawal syndrome without complication (HCC)    Staph aureus infection         PLAN:  Neck abscess status post incision and drainage    Polymicrobial including MSSA    Currently on cephalexin    Switch to Augmentin given culture results

## 2022-01-17 NOTE — PROGRESS NOTES
Shift assessment complete. A&OX4, CIWA score of 8, ativan given per PRN CIWA orders and pt request. Medications administered per MAR. Call light within reach. No further needs at this time.

## 2022-01-18 LAB
BLOOD CULTURE, ROUTINE: NORMAL
CULTURE, BLOOD 2: NORMAL
HAV IGM SER IA-ACNC: NONREACTIVE
HEPATITIS B CORE IGM ANTIBODY: NONREACTIVE
HEPATITIS B SURFACE ANTIGEN: NONREACTIVE
HEPATITIS C ANTIBODY: NONREACTIVE

## 2022-01-20 ENCOUNTER — HOSPITAL ENCOUNTER (OUTPATIENT)
Dept: WOUND CARE | Age: 42
Discharge: HOME | End: 2022-01-20
Payer: COMMERCIAL

## 2022-01-20 VITALS
RESPIRATION RATE: 14 BRPM | SYSTOLIC BLOOD PRESSURE: 121 MMHG | HEART RATE: 86 BPM | TEMPERATURE: 97.7 F | DIASTOLIC BLOOD PRESSURE: 83 MMHG

## 2022-01-20 DIAGNOSIS — S11.90XA: ICD-10-CM

## 2022-01-20 PROCEDURE — 99203 OFFICE O/P NEW LOW 30 MIN: CPT | Performed by: NURSE PRACTITIONER

## 2022-01-20 PROCEDURE — 99213 OFFICE O/P EST LOW 20 MIN: CPT

## 2022-01-20 RX ORDER — BACITRACIN, NEOMYCIN, POLYMYXIN B 400; 3.5; 5 [USP'U]/G; MG/G; [USP'U]/G
OINTMENT TOPICAL ONCE
OUTPATIENT
Start: 2022-01-20 | End: 2022-01-20

## 2022-01-20 RX ORDER — LIDOCAINE 40 MG/G
CREAM TOPICAL ONCE
OUTPATIENT
Start: 2022-01-20 | End: 2022-01-20

## 2022-01-20 RX ORDER — LIDOCAINE HYDROCHLORIDE 20 MG/ML
JELLY TOPICAL ONCE
OUTPATIENT
Start: 2022-01-20 | End: 2022-01-20

## 2022-01-20 RX ORDER — GINSENG 100 MG
CAPSULE ORAL ONCE
OUTPATIENT
Start: 2022-01-20 | End: 2022-01-20

## 2022-01-20 RX ORDER — CLOBETASOL PROPIONATE 0.5 MG/G
OINTMENT TOPICAL ONCE
OUTPATIENT
Start: 2022-01-20 | End: 2022-01-20

## 2022-01-20 RX ORDER — LIDOCAINE 50 MG/G
OINTMENT TOPICAL ONCE
OUTPATIENT
Start: 2022-01-20 | End: 2022-01-20

## 2022-01-20 RX ORDER — MAGNESIUM HYDROXIDE 1200 MG/15ML
LIQUID ORAL
Qty: 1000 ML | Refills: 1 | Status: SHIPPED | OUTPATIENT
Start: 2022-01-20 | End: 2022-01-27

## 2022-01-20 RX ORDER — BETAMETHASONE DIPROPIONATE 0.05 %
OINTMENT (GRAM) TOPICAL ONCE
OUTPATIENT
Start: 2022-01-20 | End: 2022-01-20

## 2022-01-20 RX ORDER — LIDOCAINE HYDROCHLORIDE 40 MG/ML
SOLUTION TOPICAL ONCE
OUTPATIENT
Start: 2022-01-20 | End: 2022-01-20

## 2022-01-20 RX ORDER — BACITRACIN ZINC AND POLYMYXIN B SULFATE 500; 1000 [USP'U]/G; [USP'U]/G
OINTMENT TOPICAL ONCE
OUTPATIENT
Start: 2022-01-20 | End: 2022-01-20

## 2022-01-20 RX ORDER — GENTAMICIN SULFATE 1 MG/G
OINTMENT TOPICAL ONCE
OUTPATIENT
Start: 2022-01-20 | End: 2022-01-20

## 2022-01-20 NOTE — PROGRESS NOTES
[address]ley [address]   Progress Note and Procedure Note      [address] [facility] RECORD NUMBER:  [id number]  AGE: 43 y.o. GENDER: male  : 1980  EPISODE DATE:  2022    Subjective:     Chief Complaint   Patient presents with    Wound Check     right neck         HISTORY of PRESENT ILLNESS HPI     Radha Carias is a 43 y.o. male who presents today for wound/ulcer evaluation. History of Wound Context: Patient here for follow up s/p incision and drainage of submandibular neck abscess cellulitis of neck-with cultures positive for MSSA and anaerobic gram-positive cocci B fragilis. Being followed by infectious disease who currently has the patient on Augmentin following culture results. The I & D was performed 1/15/21 by otolaryngology while inpatient, with the patient discharged 22. History of alcohol abuse which during initial presentation to the ED the patient indicated he would like assist to detox from. Today in the [facility] the patient is tremulous, reports \"a little nervous\".     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  Contributing Factors: substance abuse and history of alcohol abuse     Wound: s/p  I & D         PAST MEDICAL HISTORY        Diagnosis Date    Asthma     15 yrs old, no meds       Problem List:    Patient Active Problem List   Diagnosis    Neck abscess    EtOH dependence     Alcohol withdrawal syndrome without complication ([facility] [address]    Staph aureus infection      Problem List Items Addressed This Visit     None           PAST SURGICAL HISTORY    Past Surgical History:   Procedure Laterality Date    NECK SURGERY N/A 2022    INCISION AND DRAINAGE NECK performed by Chrissy Padilla MD [facility] FAMILY HISTORY    History reviewed. No pertinent family history. SOCIAL HISTORY    Social History     Tobacco Use    Smoking status: Current Every Day Smoker     Packs/day: 0.25     Types: Cigarettes    Smokeless tobacco: Never Used    Tobacco comment: informed risks   Substance Use Topics    Alcohol use: Yes     Comment: daily/ 1L vodka    Drug use: Not Currently       ALLERGIES    No Known Allergies    MEDICATIONS    Current Outpatient Medications on File Prior to Encounter   Medication Sig Dispense Refill    Multiple Vitamin (MULTIVITAMIN) TABS tablet Take 1 tablet by mouth daily 30 tablet 0    amoxicillin-clavulanate (AUGMENTIN) 875-125 MG per tablet Take 1 tablet by mouth 2 times daily for 14 days 28 tablet 0     No current facility-administered medications on file prior to encounter. REVIEW OF SYSTEMS    Pertinent items are noted in HPI. Objective:      /83   Pulse 86   Temp 97.7 °F (36.5 °C) (Temporal)   Resp 14     Wt Readings from Last 3 Encounters:   01/12/22 185 lb (83.9 kg)       PHYSICAL EXAM    Constitutional:   Well nourished and well developed. Appears neat and clean. Patient is alert, oriented x3, and in no apparent distress. Respiratory:  Respiratory effort is easy and symmetric bilaterally. Rate is normal at rest and on room air. Vascular:  Capillary refill is <5 sec to digits bilateral.  Extremities negative for pitting edema. Neurological:  Gross and Light touch intact. Protective sensation intact. Dermatological:  Wound description noted in wound assessment. Wound to right neck is still fairly deep, with wound bed dark red, clean, no purulence no odor. Psychiatric:  Judgement and insight intact. Short and long term memory intact. No evidence of depression, anxiety, or agitation. Patient is calm, cooperative, and communicative. Appropriate interactions and affect.       Assessment:      Problem List Items Addressed This Visit     None Procedure Note  Indications:  Based on my examination of this patient's wound(s)/ulcer(s) today, debridement is not required to promote healing and evaluate the wound base. Wound 01/20/22 #1 neck (Active)   Wound Image   01/20/22 1103   Wound Etiology Non-Healing Surgical 01/20/22 1103   Wound Cleansed Cleansed with saline 01/20/22 1103   Wound Length (cm) 0.4 cm 01/20/22 1103   Wound Width (cm) 2 cm 01/20/22 1103   Wound Depth (cm) 1.5 cm 01/20/22 1103   Wound Surface Area (cm^2) 0.8 cm^2 01/20/22 1103   Wound Volume (cm^3) 1.2 cm^3 01/20/22 1103   Drainage Amount Small 01/20/22 1103   Drainage Description Serosanguinous 01/20/22 1103   Odor None 01/20/22 1103   Margins Defined edges 01/20/22 1103   Wound Thickness Description not for Pressure Injury Full thickness 01/20/22 1103   Number of days: 0         Plan:     Continue taking antibiotic as instructed until gone. Pack wound to neck every other day, gently with ribbon gauze provided, after liberally irrigating with saline. Cover with dry sterile dressing. May expect to gradually need to use smaller amounts of packing each time. If increased drainage, odor,or purulence go to ER or call us for earlier appointment. Recheck in 1 week. Treatment Note please see attached Discharge Instructions    Written patient dismissal instructions given to patient and signed by patient or POA. Discharge [address] and [facility]   Physician Orders and Discharge Instructions  [address]  Telephone: [phone]      NAME:  Reg Maynard  YOB: 1980  MEDICAL RECORD NUMBER:  [id number]    Home Care/Facility:    Wound Location:  Neck    Dressing orders:  Irrigate with saline and dry. Pack with Sorbact Ribbon gauze. Cover with dry gauze. Change every other day.       Compression:    Offloading Device:    Other Instructions: Continue Keflex;     saline at pharmacy    Keep all dressings clean, dry and intact. Keep pressure off the wound(s) at all times. Follow up visit   1 Weeks  January 2, 2022 @  10:15    Please give 24 hour notice if unable to keep appointment. [phone]    If you experience any of the following, please call the Wound Care Service at  [phone] or go to the nearest emergency room. *Increase in pain *Temperature over 101 *Increase in drainage from your wound or a foul odor  *Uncontrolled swelling *Need for compression bandage changes due to slippage, breakthrough drainage       PLEASE NOTE: IF YOU ARE UNABLE TO OBTAIN WOUND SUPPLIES, CONTINUE TO USE THE SUPPLIES YOU HAVE AVAILABLE UNTIL YOU ARE ABLE TO REACH US.  IT IS MOST IMPORTANT TO KEEP THE WOUND COVERED AT ALL TIMES                    Electronically signed by ERIKA Cormier NP on 1/20/2022 at 11:28 AM

## 2022-04-05 ENCOUNTER — TELEPHONE (OUTPATIENT)
Dept: WOUND CARE | Age: 42
End: 2022-04-05

## 2023-09-21 NOTE — TELEPHONE ENCOUNTER
Pt did not return voice mail asking if returning to the wound clinic for follow up care. D/C'd patient from the wound clinic.
Statement Selected

## 2023-10-18 ENCOUNTER — HOSPITAL ENCOUNTER (EMERGENCY)
Facility: HOSPITAL | Age: 43
Discharge: HOME | End: 2023-10-18
Payer: COMMERCIAL

## 2023-10-18 ENCOUNTER — APPOINTMENT (OUTPATIENT)
Dept: RADIOLOGY | Facility: HOSPITAL | Age: 43
End: 2023-10-18
Payer: COMMERCIAL

## 2023-10-18 VITALS
RESPIRATION RATE: 20 BRPM | HEIGHT: 72 IN | OXYGEN SATURATION: 99 % | DIASTOLIC BLOOD PRESSURE: 84 MMHG | TEMPERATURE: 97.3 F | SYSTOLIC BLOOD PRESSURE: 124 MMHG | BODY MASS INDEX: 25.73 KG/M2 | HEART RATE: 91 BPM | WEIGHT: 190 LBS

## 2023-10-18 DIAGNOSIS — R29.898 WEAKNESS OF BOTH LOWER EXTREMITIES: ICD-10-CM

## 2023-10-18 DIAGNOSIS — E83.42 HYPOMAGNESEMIA: ICD-10-CM

## 2023-10-18 DIAGNOSIS — M79.604 PAIN IN BOTH LOWER EXTREMITIES: ICD-10-CM

## 2023-10-18 DIAGNOSIS — M79.605 PAIN IN BOTH LOWER EXTREMITIES: ICD-10-CM

## 2023-10-18 DIAGNOSIS — E83.52 HYPERCALCEMIA: Primary | ICD-10-CM

## 2023-10-18 DIAGNOSIS — E87.6 HYPOKALEMIA: ICD-10-CM

## 2023-10-18 LAB
ALBUMIN SERPL BCP-MCNC: 3.9 G/DL (ref 3.4–5)
ALP SERPL-CCNC: 90 U/L (ref 33–120)
ALT SERPL W P-5'-P-CCNC: 15 U/L (ref 10–52)
ANION GAP SERPL CALC-SCNC: 11 MMOL/L (ref 10–20)
APPEARANCE UR: ABNORMAL
AST SERPL W P-5'-P-CCNC: 16 U/L (ref 9–39)
BACTERIA #/AREA URNS AUTO: ABNORMAL /HPF
BASOPHILS # BLD AUTO: 0.01 X10*3/UL (ref 0–0.1)
BASOPHILS NFR BLD AUTO: 0.3 %
BILIRUB SERPL-MCNC: 0.8 MG/DL (ref 0–1.2)
BILIRUB UR STRIP.AUTO-MCNC: NEGATIVE MG/DL
BUN SERPL-MCNC: 44 MG/DL (ref 6–23)
CALCIUM SERPL-MCNC: 15.2 MG/DL (ref 8.6–10.3)
CAOX CRY #/AREA UR COMP ASSIST: ABNORMAL /HPF
CHLORIDE SERPL-SCNC: 97 MMOL/L (ref 98–107)
CK SERPL-CCNC: 64 U/L (ref 0–325)
CO2 SERPL-SCNC: 30 MMOL/L (ref 21–32)
COLOR UR: YELLOW
CREAT SERPL-MCNC: 3.04 MG/DL (ref 0.5–1.3)
EOSINOPHIL # BLD AUTO: 0.15 X10*3/UL (ref 0–0.7)
EOSINOPHIL NFR BLD AUTO: 4.9 %
ERYTHROCYTE [DISTWIDTH] IN BLOOD BY AUTOMATED COUNT: 13.7 % (ref 11.5–14.5)
GFR SERPL CREATININE-BSD FRML MDRD: 25 ML/MIN/1.73M*2
GLUCOSE SERPL-MCNC: 100 MG/DL (ref 74–99)
GLUCOSE UR STRIP.AUTO-MCNC: NEGATIVE MG/DL
HCT VFR BLD AUTO: 24.7 % (ref 41–52)
HGB BLD-MCNC: 8.4 G/DL (ref 13.5–17.5)
HOLD SPECIMEN: NORMAL
IMM GRANULOCYTES # BLD AUTO: 0.02 X10*3/UL (ref 0–0.7)
IMM GRANULOCYTES NFR BLD AUTO: 0.7 % (ref 0–0.9)
KETONES UR STRIP.AUTO-MCNC: NEGATIVE MG/DL
LEUKOCYTE ESTERASE UR QL STRIP.AUTO: NEGATIVE
LYMPHOCYTES # BLD AUTO: 0.49 X10*3/UL (ref 1.2–4.8)
LYMPHOCYTES NFR BLD AUTO: 16 %
MAGNESIUM SERPL-MCNC: 1.47 MG/DL (ref 1.6–2.4)
MCH RBC QN AUTO: 29.8 PG (ref 26–34)
MCHC RBC AUTO-ENTMCNC: 34 G/DL (ref 32–36)
MCV RBC AUTO: 88 FL (ref 80–100)
MONOCYTES # BLD AUTO: 0.32 X10*3/UL (ref 0.1–1)
MONOCYTES NFR BLD AUTO: 10.4 %
MUCOUS THREADS #/AREA URNS AUTO: ABNORMAL /LPF
NEUTROPHILS # BLD AUTO: 2.08 X10*3/UL (ref 1.2–7.7)
NEUTROPHILS NFR BLD AUTO: 67.7 %
NITRITE UR QL STRIP.AUTO: NEGATIVE
NRBC BLD-RTO: 0 /100 WBCS (ref 0–0)
PH UR STRIP.AUTO: 5 [PH]
PLATELET # BLD AUTO: 166 X10*3/UL (ref 150–450)
PMV BLD AUTO: 9.1 FL (ref 7.5–11.5)
POTASSIUM SERPL-SCNC: 3.4 MMOL/L (ref 3.5–5.3)
PROT SERPL-MCNC: 7.1 G/DL (ref 6.4–8.2)
PROT UR STRIP.AUTO-MCNC: ABNORMAL MG/DL
RBC # BLD AUTO: 2.82 X10*6/UL (ref 4.5–5.9)
RBC # UR STRIP.AUTO: ABNORMAL /UL
RBC #/AREA URNS AUTO: ABNORMAL /HPF
SODIUM SERPL-SCNC: 135 MMOL/L (ref 136–145)
SP GR UR STRIP.AUTO: 1.01
UROBILINOGEN UR STRIP.AUTO-MCNC: <2 MG/DL
WBC # BLD AUTO: 3.1 X10*3/UL (ref 4.4–11.3)
WBC #/AREA URNS AUTO: ABNORMAL /HPF

## 2023-10-18 PROCEDURE — 87086 URINE CULTURE/COLONY COUNT: CPT | Mod: CMCLAB,ELYLAB

## 2023-10-18 PROCEDURE — 82374 ASSAY BLOOD CARBON DIOXIDE: CPT

## 2023-10-18 PROCEDURE — 99285 EMERGENCY DEPT VISIT HI MDM: CPT | Mod: 25

## 2023-10-18 PROCEDURE — 36415 COLL VENOUS BLD VENIPUNCTURE: CPT

## 2023-10-18 PROCEDURE — 85025 COMPLETE CBC W/AUTO DIFF WBC: CPT

## 2023-10-18 PROCEDURE — 72131 CT LUMBAR SPINE W/O DYE: CPT | Performed by: RADIOLOGY

## 2023-10-18 PROCEDURE — 96365 THER/PROPH/DIAG IV INF INIT: CPT

## 2023-10-18 PROCEDURE — 2500000004 HC RX 250 GENERAL PHARMACY W/ HCPCS (ALT 636 FOR OP/ED)

## 2023-10-18 PROCEDURE — 82085 ASSAY OF ALDOLASE: CPT

## 2023-10-18 PROCEDURE — 70450 CT HEAD/BRAIN W/O DYE: CPT | Performed by: RADIOLOGY

## 2023-10-18 PROCEDURE — 96361 HYDRATE IV INFUSION ADD-ON: CPT

## 2023-10-18 PROCEDURE — 70450 CT HEAD/BRAIN W/O DYE: CPT

## 2023-10-18 PROCEDURE — 83735 ASSAY OF MAGNESIUM: CPT

## 2023-10-18 PROCEDURE — 82550 ASSAY OF CK (CPK): CPT

## 2023-10-18 PROCEDURE — 72131 CT LUMBAR SPINE W/O DYE: CPT

## 2023-10-18 PROCEDURE — 81001 URINALYSIS AUTO W/SCOPE: CPT

## 2023-10-18 RX ORDER — PREDNISONE 20 MG/1
20 TABLET ORAL 2 TIMES DAILY
Qty: 10 TABLET | Refills: 0 | Status: SHIPPED | OUTPATIENT
Start: 2023-10-18 | End: 2023-10-23

## 2023-10-18 RX ORDER — POTASSIUM CHLORIDE 20 MEQ/1
20 TABLET, EXTENDED RELEASE ORAL DAILY
Status: DISCONTINUED | OUTPATIENT
Start: 2023-10-18 | End: 2023-10-18 | Stop reason: HOSPADM

## 2023-10-18 RX ORDER — MAGNESIUM SULFATE HEPTAHYDRATE 40 MG/ML
2 INJECTION, SOLUTION INTRAVENOUS ONCE
Status: COMPLETED | OUTPATIENT
Start: 2023-10-18 | End: 2023-10-18

## 2023-10-18 RX ADMIN — POTASSIUM CHLORIDE 20 MEQ: 1500 TABLET, EXTENDED RELEASE ORAL at 12:03

## 2023-10-18 RX ADMIN — SODIUM CHLORIDE 1000 ML: 9 INJECTION, SOLUTION INTRAVENOUS at 11:13

## 2023-10-18 RX ADMIN — MAGNESIUM SULFATE HEPTAHYDRATE 2 G: 40 INJECTION, SOLUTION INTRAVENOUS at 12:04

## 2023-10-18 ASSESSMENT — PAIN - FUNCTIONAL ASSESSMENT: PAIN_FUNCTIONAL_ASSESSMENT: 0-10

## 2023-10-18 ASSESSMENT — LIFESTYLE VARIABLES
EVER HAD A DRINK FIRST THING IN THE MORNING TO STEADY YOUR NERVES TO GET RID OF A HANGOVER: NO
HAVE YOU EVER FELT YOU SHOULD CUT DOWN ON YOUR DRINKING: NO
REASON UNABLE TO ASSESS: NO
EVER FELT BAD OR GUILTY ABOUT YOUR DRINKING: NO
HAVE PEOPLE ANNOYED YOU BY CRITICIZING YOUR DRINKING: NO

## 2023-10-18 ASSESSMENT — COLUMBIA-SUICIDE SEVERITY RATING SCALE - C-SSRS
2. HAVE YOU ACTUALLY HAD ANY THOUGHTS OF KILLING YOURSELF?: NO
6. HAVE YOU EVER DONE ANYTHING, STARTED TO DO ANYTHING, OR PREPARED TO DO ANYTHING TO END YOUR LIFE?: NO
1. IN THE PAST MONTH, HAVE YOU WISHED YOU WERE DEAD OR WISHED YOU COULD GO TO SLEEP AND NOT WAKE UP?: NO

## 2023-10-18 ASSESSMENT — PAIN SCALES - GENERAL: PAINLEVEL_OUTOF10: 0 - NO PAIN

## 2023-10-18 ASSESSMENT — PAIN DESCRIPTION - PAIN TYPE: TYPE: ACUTE PAIN

## 2023-10-18 ASSESSMENT — PAIN DESCRIPTION - DESCRIPTORS: DESCRIPTORS: ACHING;THROBBING

## 2023-10-20 LAB
ALDOLASE SERPL-CCNC: 4.8 U/L (ref 1.2–7.6)
BACTERIA UR CULT: NO GROWTH

## (undated) DEVICE — TUBING, SUCTION, 1/4" X 10', STRAIGHT: Brand: MEDLINE

## (undated) DEVICE — 3M™ STERI-DRAPE™ INSTRUMENT POUCH 1018: Brand: STERI-DRAPE™

## (undated) DEVICE — INTENDED FOR TISSUE SEPARATION, AND OTHER PROCEDURES THAT REQUIRE A SHARP SURGICAL BLADE TO PUNCTURE OR CUT.: Brand: BARD-PARKER ® CARBON RIB-BACK BLADES

## (undated) DEVICE — GAUZE,SPONGE,4"X4",16PLY,XRAY,STRL,LF: Brand: MEDLINE

## (undated) DEVICE — TOWEL,OR,DSP,ST,BLUE,STD,4/PK,20PK/CS: Brand: MEDLINE

## (undated) DEVICE — SKIN PREP TRAY 4 COMPARTM TRAY: Brand: MEDLINE INDUSTRIES, INC.

## (undated) DEVICE — SUTURE VCRL SZ 4-0 L18IN ABSRB UD L19MM PS-2 3/8 CIR PRIM J496H

## (undated) DEVICE — GLOVE ORANGE PI 7 1/2   MSG9075

## (undated) DEVICE — MAGNETIC INSTR DRAPE 20X16: Brand: MEDLINE INDUSTRIES, INC.

## (undated) DEVICE — PAD N ADH W3XL4IN POLY COT SFT PERF FLM EASILY CUT ABSRB

## (undated) DEVICE — NEPTUNE E-SEP SMOKE EVACUATION PENCIL, COATED, 70MM BLADE, PUSH BUTTON SWITCH: Brand: NEPTUNE E-SEP

## (undated) DEVICE — PACK,EENT,TURBAN DRAPE,PK II: Brand: MEDLINE

## (undated) DEVICE — SUTURE VCRL SZ 3-0 L54IN ABSRB VLT LIGAPAK REEL NDL J205G

## (undated) DEVICE — SYRINGE IRRIG 60ML SFT PLIABLE BLB EZ TO GRP 1 HND USE W/

## (undated) DEVICE — MARKER SURG SKIN GENTIAN VLT REG TIP W/ 6IN RUL

## (undated) DEVICE — SYRINGE MED 10ML LUERLOCK TIP W/O SFTY DISP

## (undated) DEVICE — COUNTER NDL 40 COUNT HLD 70 FOAM BLK ADH W/ MAG

## (undated) DEVICE — ELECTRODE NDL 2.8IN COAT VALLEYLAB

## (undated) DEVICE — SUTURE VCRL SZ 3-0 L18IN ABSRB UD PS-2 L19MM 3/8 CRV PRIM J497H

## (undated) DEVICE — SPONGE GZ W4XL4IN RAYON POLY FILL CVR W/ NONWOVEN FAB

## (undated) DEVICE — LABEL MED MINI W/ MARKER

## (undated) DEVICE — GAUZE,PACKING STRIP,IODOFORM,1/2"X5YD,ST: Brand: CURAD

## (undated) DEVICE — 3M™ STERI-STRIP™ REINFORCED ADHESIVE SKIN CLOSURES, R1547, 1/2 IN X 4 IN (12 MM X 100 MM), 6 STRIPS/ENVELOPE: Brand: 3M™ STERI-STRIP™

## (undated) DEVICE — STANDARD HYPODERMIC NEEDLE,POLYPROPYLENE HUB: Brand: MONOJECT

## (undated) DEVICE — GOWN,AURORA,NONREINFORCED,LARGE: Brand: MEDLINE